# Patient Record
Sex: MALE | Race: WHITE | NOT HISPANIC OR LATINO | Employment: FULL TIME | URBAN - METROPOLITAN AREA
[De-identification: names, ages, dates, MRNs, and addresses within clinical notes are randomized per-mention and may not be internally consistent; named-entity substitution may affect disease eponyms.]

---

## 2018-01-16 NOTE — PROGRESS NOTES
Assessment    1  Major depression (296 20) (F32 9)   2  Nicotine dependence (305 1) (F17 200)    Plan  Nicotine dependence    · Nicotine 21 MG/24HR Transdermal Patch 24 Hour; APPLY 1 PATCH DAILY AS  DIRECTED   · We recommend you quit smoking  Time spent counseling today was greater than 3  minutes ; Status:Complete;   Done: [id number]    Discussion/Summary    1  Major depression-patient has been stable for a decade on Zoloft 100 mg daily  He denies any side effects of the medication and would like to continue  Refill was given  Followup in the next 6 months  2  Nicotinic dependence-smoking cessation was discussed and patient plans to use nicotinic patches  Disclaimer: This note has been dictated but not proofread  Voice recognition software has been used and may contain errors  The patient was counseled regarding instructions for management, risk factor reductions, prognosis, patient and family education, impressions, risks and benefits of treatment options, importance of compliance with treatment  total time of encounter was 25 minutes and 20 minutes was spent counseling  Chief Complaint  Pt would like to discuss our office taking over prescribing his Sertraline 100 [facility] QD [facility] but due to scheduling difficulty he would like to change  Also pt would like to quit smoking and would like to use the patches and needs something in writing that they are safe with his current medication  kw      History of Present Illness  HPI: This is a 78-year-old gentleman that presents to the office for followup of depression  He has been on treatment with Zoloft for a decade  He has been feeling well on the 100 mg daily without complaints  He has most recently been [facility] this however it has been more difficult to get care there recently and he is interested in having us follow his medication regimen   He is also interested in smoking cessation and is currently smoking a half pack of cigarettes daily  He has thought about treatment options and is most interested in using the patches  Review of Systems    Constitutional: no fever, not feeling poorly, no chills and not feeling tired  ENT: no nosebleeds and no nasal discharge  Cardiovascular: no chest pain and no palpitations  Respiratory: no cough and no shortness of breath during exertion  Gastrointestinal: no nausea and no diarrhea  Musculoskeletal: no arthralgias and no myalgias  Active Problems    1  Cerumen impaction (380 4) (H61 20)   2  Otitis externa (380 10) (H60 90)    Past Medical History  Active Problems And Past Medical History Reviewed: The active problems and past medical history were reviewed and updated today  Surgical History    1  History of Myringotomy - With Ventilating Tube Insertion    Current Meds   1  [employer] Saline Nasal [place] SOLN; USE 1 SPRAY IN EACH NOSTRIL TWICE DAILY   Recorded   2  Zoloft 100 MG Oral Tablet; Take 1 tablet daily as directed Recorded    The medication list was reviewed and updated today  Allergies    1  No Known Drug Allergies    Vitals   Recorded: [id number] 12:41PM   Heart Rate 68   Respiration 16   Systolic 183   Diastolic 80   Height 5 ft 10 7 in   Weight 177 lb 4 oz   BMI Calculated 24 93   BSA Calculated 2     Physical Exam    Constitutional   General appearance: No acute distress, well appearing and well nourished  Eyes   Conjunctiva and lids: No swelling, erythema, or discharge  Pupils and irises: Equal, round and reactive to light  Ears, Nose, Mouth, and Throat   External inspection of ears and nose: Normal     Otoscopic examination: Tympanic membrance translucent with normal light reflex  Canals patent without erythema  Nasal mucosa, septum, and turbinates: Normal without edema or erythema  Oropharynx: Normal with no erythema, edema, exudate or lesions      Pulmonary   Respiratory effort: No increased work of breathing or signs of respiratory distress  Auscultation of lungs: Clear to auscultation, equal breath sounds bilaterally, no wheezes, no rales, no rhonci  Cardiovascular   Auscultation of heart: Normal rate and rhythm, normal S1 and S2, without murmurs  Examination of extremities for edema and/or varicosities: Normal     Abdomen   Abdomen: Non-tender, no masses  Liver and spleen: No hepatomegaly or splenomegaly  Musculoskeletal   Gait and station: Normal     Skin   Skin and subcutaneous tissue: Normal without rashes or lesions  Neurologic   Reflexes: 2+ and symmetric  Sensation: No sensory loss      Psychiatric   Orientation to person, place and time: Normal     Mood and affect: Normal          Results/Data  PHQ-2 Adult Depression Screening [id number] 12:43PM User, [employer]     Test Name Result Flag Reference   PHQ-2 Adult Depression Score 0     Q1: 0, Q2: 0   PHQ-2 Adult Depression Screening Negative       eCalcs - Health Calculators [id number] 12:42PM User, [employer]     Test Name Result Flag Reference   SBIRT Screen - Tobacco Screening Result Positive         Signatures   Electronically signed by : Caity White, [facility]; Feb  3 2016 12:58PM EST                       (Author)    Electronically signed by : LEFTY Ordoñez ; Feb  3 2016  3:08PM EST

## 2018-12-07 ENCOUNTER — TELEPHONE (OUTPATIENT)
Dept: GASTROENTEROLOGY | Facility: CLINIC | Age: 43
End: 2018-12-07

## 2018-12-21 ENCOUNTER — OFFICE VISIT (OUTPATIENT)
Dept: GASTROENTEROLOGY | Facility: MEDICAL CENTER | Age: 43
End: 2018-12-21
Payer: OTHER GOVERNMENT

## 2018-12-21 VITALS
WEIGHT: 197 LBS | DIASTOLIC BLOOD PRESSURE: 60 MMHG | BODY MASS INDEX: 27.58 KG/M2 | HEART RATE: 60 BPM | SYSTOLIC BLOOD PRESSURE: 120 MMHG | TEMPERATURE: 96.9 F | HEIGHT: 71 IN

## 2018-12-21 DIAGNOSIS — D50.9 IRON DEFICIENCY ANEMIA, UNSPECIFIED IRON DEFICIENCY ANEMIA TYPE: ICD-10-CM

## 2018-12-21 DIAGNOSIS — Z86.19 HISTORY OF HELICOBACTER PYLORI INFECTION: ICD-10-CM

## 2018-12-21 DIAGNOSIS — K26.9 DUODENAL ULCER: Primary | ICD-10-CM

## 2018-12-21 PROCEDURE — 99203 OFFICE O/P NEW LOW 30 MIN: CPT | Performed by: INTERNAL MEDICINE

## 2018-12-21 RX ORDER — PANTOPRAZOLE SODIUM 40 MG/1
TABLET, DELAYED RELEASE ORAL
Refills: 0 | COMMUNITY
Start: 2018-11-10 | End: 2021-03-09

## 2018-12-21 RX ORDER — SERTRALINE HYDROCHLORIDE 100 MG/1
100 TABLET, FILM COATED ORAL
COMMUNITY
End: 2022-04-06 | Stop reason: SDUPTHER

## 2018-12-21 NOTE — PATIENT INSTRUCTIONS
Take pantoprazole for 2 week than   Wean off of pantoprazole over 2 weeks (every other day and every 2-3 days)   Avoid NSAIDs  Check stool H Pylori 2 weeks after off of Pantoprazole

## 2018-12-21 NOTE — LETTER
December 21, 2018     Reji Fong MD  [address]  [address]    Patient: Eve Ballard   YOB: 1975   Date of Visit: 12/21/2018       Dear Dr Grewal Cancer: Thank you for referring Daiva Goldmann to me for evaluation  Below are my notes for this consultation  If you have questions, please do not hesitate to call me  I look forward to following your patient along with you  Sincerely,        Tereso Brandon MD        CC: DO Tereso Ball MD  12/21/2018 12:40 PM  Sign at close encounter  [facility] - Outpatient Consultation  Eve Ballard 44 y o  male MRN: [id number]  Encounter: [id number]          ASSESSMENT AND PLAN:      1  Iron deficiency anemia, unspecified iron deficiency anemia type as a result of duodenal ulcer  - CBC and differential; Future    2  History of Helicobacter pylori infection  - H  pylori antigen, stool; Future    Continue monitor H&H  We will continue PPI for 2 weeks and slowly wean off and check stool H pylori once she is off PPI for 2 weeks  No need for repeat EGD for duodenal ulcer  Currently asymptomatic    ______________________________________________________________________    HPI:      He was admitted in 1st week of November 2018 for evaluation for melena  He was identified to have H pylori on biopsies associated gastritis and duodenal ulcerations which were less than 1 cm   2 duodenal ulcers were identified  He was started on PPI therapy  His hemoglobin was approximately 12 when prior to that was within normal limits  He has no signs overt bleeding currently taking iron therapy  He has eradicated for H pylori which triple therapy with 2 weeks of antibiotics  Currently continue take pantoprazole daily  He reports no signs overt bleeding  He is taking iron supplements for borderline anemia  He presents here for follow-up of duodenal ulcers, anemia and history of H pylori  Discuss slowly weaning off PPI therapy and check to make sure H pylori has been eradicated  No need for repeat endoscopy to confirm duodenal ulcer healing  He was based in the [place] [facility] when he was in the [facility] was exposed H pylori  REVIEW OF SYSTEMS:    CONSTITUTIONAL: Denies any fever, chills, rigors, and weight loss  HEENT: No earache or tinnitus  Denies hearing loss or visual disturbances  CARDIOVASCULAR: No chest pain or palpitations  RESPIRATORY: Denies any cough, hemoptysis, shortness of breath or dyspnea on exertion  GASTROINTESTINAL: As noted in the History of Present Illness  GENITOURINARY: No problems with urination  Denies any hematuria or dysuria  NEUROLOGIC: No dizziness or vertigo, denies headaches  MUSCULOSKELETAL: Denies any muscle or joint pain  SKIN: Denies skin rashes or itching  ENDOCRINE: Denies excessive thirst  Denies intolerance to heat or cold  PSYCHOSOCIAL: Denies depression or anxiety  Denies any recent memory loss  Historical Information   No past medical history on file  Past Surgical History:   Procedure Laterality Date    TYMPANOSTOMY TUBE PLACEMENT       Social History   History   Alcohol Use    Yes     History   Drug Use No     History   Smoking Status    Current Some Day Smoker   Smokeless Tobacco    Never Used     Family History   Problem Relation Age of Onset    Cancer Maternal Grandfather        Meds/Allergies       Current Outpatient Prescriptions:     pantoprazole (PROTONIX) 40 mg tablet    sertraline (ZOLOFT) 100 mg tablet    No Known Allergies        Objective     Blood pressure 120/60, pulse 60, temperature (!) 96 9 °F (36 1 °C), temperature source Tympanic, height 5' 11" (1 803 m), weight 89 4 kg (197 lb)  Body mass index is 27 48 kg/m²          PHYSICAL EXAM:      General Appearance:   Alert, cooperative, no distress   HEENT:   Normocephalic, atraumatic, anicteric      Neck:  Supple, symmetrical, trachea midline   Lungs:   Clear to auscultation bilaterally; no rales, rhonchi or wheezing; respirations unlabored    Heart[de-identified]   Regular rate and rhythm; no murmur, rub, or gallop  Abdomen:   Soft, non-tender, non-distended; normal bowel sounds; no masses, no organomegaly    Genitalia:   Deferred    Rectal:   Deferred    Extremities:  No cyanosis, clubbing or edema    Pulses:  2+ and symmetric    Skin:  No jaundice, rashes, or lesions    Lymph nodes:  No palpable cervical lymphadenopathy        Lab Results:   No visits with results within 1 Day(s) from this visit  Latest known visit with results is:   No results found for any previous visit  Radiology Results:   No results found

## 2018-12-21 NOTE — PROGRESS NOTES
[facility]  Regulo Kaminski 44 y o  male MRN: [id number]  Encounter: [id number]          ASSESSMENT AND PLAN:      1  Iron deficiency anemia, unspecified iron deficiency anemia type as a result of duodenal ulcer  - CBC and differential; Future    2  History of Helicobacter pylori infection  - H  pylori antigen, stool; Future    Continue monitor H&H  We will continue PPI for 2 weeks and slowly wean off and check stool H pylori once she is off PPI for 2 weeks  No need for repeat EGD for duodenal ulcer  Currently asymptomatic    ______________________________________________________________________    HPI:      He was admitted in 1st week of November 2018 for evaluation for melena  He was identified to have H pylori on biopsies associated gastritis and duodenal ulcerations which were less than 1 cm   2 duodenal ulcers were identified  He was started on PPI therapy  His hemoglobin was approximately 12 when prior to that was within normal limits  He has no signs overt bleeding currently taking iron therapy  He has eradicated for H pylori which triple therapy with 2 weeks of antibiotics  Currently continue take pantoprazole daily  He reports no signs overt bleeding  He is taking iron supplements for borderline anemia  He presents here for follow-up of duodenal ulcers, anemia and history of H pylori  Discuss slowly weaning off PPI therapy and check to make sure H pylori has been eradicated  No need for repeat endoscopy to confirm duodenal ulcer healing  He was based in the [place] [facility] when he was in the [facility] was exposed H pylori  REVIEW OF SYSTEMS:    CONSTITUTIONAL: Denies any fever, chills, rigors, and weight loss  HEENT: No earache or tinnitus  Denies hearing loss or visual disturbances  CARDIOVASCULAR: No chest pain or palpitations     RESPIRATORY: Denies any cough, hemoptysis, shortness of breath or dyspnea on exertion  GASTROINTESTINAL: As noted in the History of Present Illness  GENITOURINARY: No problems with urination  Denies any hematuria or dysuria  NEUROLOGIC: No dizziness or vertigo, denies headaches  MUSCULOSKELETAL: Denies any muscle or joint pain  SKIN: Denies skin rashes or itching  ENDOCRINE: Denies excessive thirst  Denies intolerance to heat or cold  PSYCHOSOCIAL: Denies depression or anxiety  Denies any recent memory loss  Historical Information   No past medical history on file  Past Surgical History:   Procedure Laterality Date    TYMPANOSTOMY TUBE PLACEMENT       Social History   History   Alcohol Use    Yes     History   Drug Use No     History   Smoking Status    Current Some Day Smoker   Smokeless Tobacco    Never Used     Family History   Problem Relation Age of Onset    Cancer Maternal Grandfather        Meds/Allergies       Current Outpatient Prescriptions:     pantoprazole (PROTONIX) 40 mg tablet    sertraline (ZOLOFT) 100 mg tablet    No Known Allergies        Objective     Blood pressure 120/60, pulse 60, temperature (!) 96 9 °F (36 1 °C), temperature source Tympanic, height 5' 11" (1 803 m), weight 89 4 kg (197 lb)  Body mass index is 27 48 kg/m²  PHYSICAL EXAM:      General Appearance:   Alert, cooperative, no distress   HEENT:   Normocephalic, atraumatic, anicteric      Neck:  Supple, symmetrical, trachea midline   Lungs:   Clear to auscultation bilaterally; no rales, rhonchi or wheezing; respirations unlabored    Heart[de-identified]   Regular rate and rhythm; no murmur, rub, or gallop  Abdomen:   Soft, non-tender, non-distended; normal bowel sounds; no masses, no organomegaly    Genitalia:   Deferred    Rectal:   Deferred    Extremities:  No cyanosis, clubbing or edema    Pulses:  2+ and symmetric    Skin:  No jaundice, rashes, or lesions    Lymph nodes:  No palpable cervical lymphadenopathy        Lab Results:   No visits with results within 1 Day(s) from this visit  Latest known visit with results is:   No results found for any previous visit  Radiology Results:   No results found

## 2019-02-08 ENCOUNTER — TELEPHONE (OUTPATIENT)
Dept: GASTROENTEROLOGY | Facility: MEDICAL CENTER | Age: 44
End: 2019-02-08

## 2019-02-08 ENCOUNTER — APPOINTMENT (OUTPATIENT)
Dept: LAB | Facility: MEDICAL CENTER | Age: 44
End: 2019-02-08
Attending: INTERNAL MEDICINE
Payer: OTHER GOVERNMENT

## 2019-02-08 DIAGNOSIS — D50.9 IRON DEFICIENCY ANEMIA, UNSPECIFIED IRON DEFICIENCY ANEMIA TYPE: ICD-10-CM

## 2019-02-08 DIAGNOSIS — Z86.19 HISTORY OF HELICOBACTER PYLORI INFECTION: ICD-10-CM

## 2019-02-08 LAB
BASOPHILS # BLD AUTO: 0.03 THOUSANDS/ΜL (ref 0–0.1)
BASOPHILS NFR BLD AUTO: 1 % (ref 0–1)
EOSINOPHIL # BLD AUTO: 0.11 THOUSAND/ΜL (ref 0–0.61)
EOSINOPHIL NFR BLD AUTO: 2 % (ref 0–6)
ERYTHROCYTE [DISTWIDTH] IN BLOOD BY AUTOMATED COUNT: 12.3 % (ref 11.6–15.1)
HCT VFR BLD AUTO: 43.6 % (ref 36.5–49.3)
HGB BLD-MCNC: 14.7 G/DL (ref 12–17)
IMM GRANULOCYTES # BLD AUTO: 0.01 THOUSAND/UL (ref 0–0.2)
IMM GRANULOCYTES NFR BLD AUTO: 0 % (ref 0–2)
LYMPHOCYTES # BLD AUTO: 1.96 THOUSANDS/ΜL (ref 0.6–4.47)
LYMPHOCYTES NFR BLD AUTO: 43 % (ref 14–44)
MCH RBC QN AUTO: 30.1 PG (ref 26.8–34.3)
MCHC RBC AUTO-ENTMCNC: 33.7 G/DL (ref 31.4–37.4)
MCV RBC AUTO: 89 FL (ref 82–98)
MONOCYTES # BLD AUTO: 0.48 THOUSAND/ΜL (ref 0.17–1.22)
MONOCYTES NFR BLD AUTO: 10 % (ref 4–12)
NEUTROPHILS # BLD AUTO: 2.02 THOUSANDS/ΜL (ref 1.85–7.62)
NEUTS SEG NFR BLD AUTO: 44 % (ref 43–75)
NRBC BLD AUTO-RTO: 0 /100 WBCS
PLATELET # BLD AUTO: 209 THOUSANDS/UL (ref 149–390)
PMV BLD AUTO: 11.1 FL (ref 8.9–12.7)
RBC # BLD AUTO: 4.89 MILLION/UL (ref 3.88–5.62)
WBC # BLD AUTO: 4.61 THOUSAND/UL (ref 4.31–10.16)

## 2019-02-08 PROCEDURE — 36415 COLL VENOUS BLD VENIPUNCTURE: CPT

## 2019-02-08 PROCEDURE — 85025 COMPLETE CBC W/AUTO DIFF WBC: CPT

## 2019-02-08 PROCEDURE — 87338 HPYLORI STOOL AG IA: CPT

## 2019-02-08 NOTE — TELEPHONE ENCOUNTER
----- Message from Nuno Garcia MD sent at 2/8/2019  1:57 PM EST -----  Call patient to report normal results

## 2019-02-10 LAB — H PYLORI AG STL QL IA: NEGATIVE

## 2019-02-13 ENCOUNTER — TELEPHONE (OUTPATIENT)
Dept: GASTROENTEROLOGY | Facility: MEDICAL CENTER | Age: 44
End: 2019-02-13

## 2019-02-13 NOTE — TELEPHONE ENCOUNTER
----- Message from Stephanie Ayoub MD sent at 2/13/2019  2:13 PM EST -----  Call patient to report normal results

## 2019-09-25 ENCOUNTER — OFFICE VISIT (OUTPATIENT)
Dept: FAMILY MEDICINE CLINIC | Facility: CLINIC | Age: 44
End: 2019-09-25
Payer: COMMERCIAL

## 2019-09-25 VITALS
RESPIRATION RATE: 18 BRPM | HEIGHT: 71 IN | BODY MASS INDEX: 24.92 KG/M2 | WEIGHT: 178 LBS | DIASTOLIC BLOOD PRESSURE: 82 MMHG | TEMPERATURE: 99 F | HEART RATE: 72 BPM | SYSTOLIC BLOOD PRESSURE: 118 MMHG

## 2019-09-25 DIAGNOSIS — R11.0 NAUSEA: ICD-10-CM

## 2019-09-25 DIAGNOSIS — D50.9 IRON DEFICIENCY ANEMIA, UNSPECIFIED IRON DEFICIENCY ANEMIA TYPE: ICD-10-CM

## 2019-09-25 DIAGNOSIS — F43.21 GRIEF REACTION: ICD-10-CM

## 2019-09-25 DIAGNOSIS — F33.40 RECURRENT MAJOR DEPRESSIVE DISORDER, IN REMISSION (HCC): ICD-10-CM

## 2019-09-25 DIAGNOSIS — R10.9 ABDOMINAL CRAMPS: ICD-10-CM

## 2019-09-25 DIAGNOSIS — Z86.19 HISTORY OF HELICOBACTER PYLORI INFECTION: ICD-10-CM

## 2019-09-25 DIAGNOSIS — F17.210 CIGARETTE NICOTINE DEPENDENCE WITHOUT COMPLICATION: ICD-10-CM

## 2019-09-25 DIAGNOSIS — K21.00 GASTRO-ESOPHAGEAL REFLUX DISEASE WITH ESOPHAGITIS: ICD-10-CM

## 2019-09-25 DIAGNOSIS — K26.9 DUODENAL ULCER: Primary | ICD-10-CM

## 2019-09-25 DIAGNOSIS — R19.7 DIARRHEA, UNSPECIFIED TYPE: ICD-10-CM

## 2019-09-25 DIAGNOSIS — F41.9 ANXIETY: ICD-10-CM

## 2019-09-25 PROCEDURE — 3008F BODY MASS INDEX DOCD: CPT | Performed by: FAMILY MEDICINE

## 2019-09-25 PROCEDURE — 99204 OFFICE O/P NEW MOD 45 MIN: CPT | Performed by: FAMILY MEDICINE

## 2019-09-25 RX ORDER — DICYCLOMINE HYDROCHLORIDE 10 MG/1
10 CAPSULE ORAL
Qty: 40 CAPSULE | Refills: 0 | Status: SHIPPED | OUTPATIENT
Start: 2019-09-25 | End: 2021-03-09

## 2019-09-25 NOTE — PROGRESS NOTES
Assessment and Plan:  1  Abdominal cramps/nausea/ diarrhea  I believe secondary to below  Bentyl was ordered  2  Grief reaction, refer for counseling  3  Anxiety / depression patient is on Zoloft 100 mg per Psychiatry continue with this  4  Duodenal ulcer/ H pylori/ GERD/ iron deficiency, stable patient follows with Gastroenterology  5  Tobacco abuse, complete cessation is recommended  6  Patient to return in 1 week if still symptoms  If symptoms worsen call office report to [facility]    Problem List Items Addressed This Visit        Digestive    Duodenal ulcer - Primary    Relevant Medications    dicyclomine (BENTYL) 10 mg capsule    Gastro-esophageal reflux disease with esophagitis    Relevant Medications    dicyclomine (BENTYL) 10 mg capsule       Other    Iron deficiency anemia    History of Helicobacter pylori infection    Anxiety      Continue Zoloft per Psychiatry         Major depression      Continue Zoloft per Psychiatry         Nicotine dependence      Complete tobacco cessation recommended           Other Visit Diagnoses     Grief reaction        Relevant Orders    Ambulatory referral to Social Work    Abdominal cramps        Relevant Medications    dicyclomine (BENTYL) 10 mg capsule    Nausea        Relevant Medications    dicyclomine (BENTYL) 10 mg capsule    Diarrhea, unspecified type        Relevant Medications    dicyclomine (BENTYL) 10 mg capsule                 Diagnoses and all orders for this visit:    Duodenal ulcer    Gastro-esophageal reflux disease with esophagitis    Iron deficiency anemia, unspecified iron deficiency anemia type    History of Helicobacter pylori infection    Anxiety    Recurrent major depressive disorder, in remission ([facility])    Cigarette nicotine dependence without complication    Grief reaction  -     Ambulatory referral to Social Work; Future    Abdominal cramps  -     dicyclomine (BENTYL) 10 mg capsule;  Take 1 capsule (10 mg total) by mouth 4 (four) times a day (before meals and at bedtime)    Nausea  -     dicyclomine (BENTYL) 10 mg capsule; Take 1 capsule (10 mg total) by mouth 4 (four) times a day (before meals and at bedtime)    Diarrhea, unspecified type  -     dicyclomine (BENTYL) 10 mg capsule; Take 1 capsule (10 mg total) by mouth 4 (four) times a day (before meals and at bedtime)              Subjective:      Patient ID: Vj Gomes is a 38 y o  male  CC:    Chief Complaint   Patient presents with    Diarrhea     Patient present today for diarrhea, stomach cramps and vomiting for the last 3 days  Per patient he had to put his dog down and ever since he is having this symptoms  He believes he might be having anxiety attacks  HPI:     Patient had to put his 6year-old dog to sleep 3 days ago  This is certainly upset him since this time he had poor appetite nausea and loose stools  Patient does have a history of duodenal ulcer in GERD is on  Pantoprazole  Patient feels this is different pills patient feels this is all stress and anxiety secondary to the dog  Offered give him something for the anxiety and grief patient declined  Patient use exercise will see counselor in office I will set that up  We use Bentyl for his GI complaints  If patient still symptoms next week return to office  Patient last seen in this  office 07/11/2016      The following portions of the patient's history were reviewed and updated as appropriate: allergies, current medications, past family history, past medical history, past social history, past surgical history and problem list       Review of Systems   Constitutional: Negative  HENT: Negative  Eyes: Negative  Respiratory: Negative  Cardiovascular: Negative  Gastrointestinal:         HPI   Endocrine: Negative  Genitourinary: Negative  Musculoskeletal: Negative  Skin: Negative  Allergic/Immunologic: Negative  Neurological: Negative      Hematological: Negative  Psychiatric/Behavioral:         HP         Data to review:       Objective:    Vitals:    09/25/19 1525   BP: 118/82   BP Location: Left arm   Patient Position: Sitting   Cuff Size: Standard   Pulse: 72   Resp: 18   Temp: 99 °F (37 2 °C)   TempSrc: Temporal   Weight: 80 7 kg (178 lb)   Height: 5' 11" (1 803 m)        Physical Exam   Constitutional: He is oriented to person, place, and time  He appears well-developed and well-nourished  HENT:   Head: Normocephalic and atraumatic  Mouth/Throat: Oropharynx is clear and moist    Eyes: Pupils are equal, round, and reactive to light  Conjunctivae and EOM are normal  No scleral icterus  Neck: Neck supple  No JVD present  Cardiovascular: Normal rate, regular rhythm and normal heart sounds  Pulmonary/Chest: Effort normal and breath sounds normal    Abdominal: Soft  Bowel sounds are normal  He exhibits no distension and no mass  There is no tenderness  There is no rebound and no guarding  No hernia  Musculoskeletal: He exhibits no edema  Neurological: He is alert and oriented to person, place, and time  No cranial nerve deficit  Skin: Skin is warm     Psychiatric:    Seems anxious

## 2019-09-25 NOTE — LETTER
September 25, 2019     Patient: Flavia Villa   YOB: 1975   Date of Visit: 9/25/2019       To Whom it May Concern:    Polly Mario is under my professional care  He was seen in my office on 9/25/2019  He may return to work on   09/30/2019  If you have any questions or concerns, please don't hesitate to call           Sincerely,          Kory Candelaria DO        CC: No Recipients

## 2019-09-25 NOTE — PATIENT INSTRUCTIONS
Patient follow up with counseling for grief reaction  Continue follow-up with Psychiatry and Gastroenterology per their instructions  Patient to return to office in 1 week if still symptoms    If symptoms worsen call office report to [facility] Department

## 2020-06-30 ENCOUNTER — TELEPHONE (OUTPATIENT)
Dept: FAMILY MEDICINE CLINIC | Facility: CLINIC | Age: 45
End: 2020-06-30

## 2020-06-30 ENCOUNTER — TELEMEDICINE (OUTPATIENT)
Dept: FAMILY MEDICINE CLINIC | Facility: CLINIC | Age: 45
End: 2020-06-30
Payer: COMMERCIAL

## 2020-06-30 VITALS — DIASTOLIC BLOOD PRESSURE: 97 MMHG | SYSTOLIC BLOOD PRESSURE: 138 MMHG | HEART RATE: 75 BPM

## 2020-06-30 DIAGNOSIS — M54.2 NECK PAIN: ICD-10-CM

## 2020-06-30 DIAGNOSIS — M25.511 ACUTE PAIN OF RIGHT SHOULDER: Primary | ICD-10-CM

## 2020-06-30 PROCEDURE — 99214 OFFICE O/P EST MOD 30 MIN: CPT | Performed by: NURSE PRACTITIONER

## 2020-06-30 RX ORDER — CYCLOBENZAPRINE HCL 10 MG
10 TABLET ORAL
Qty: 30 TABLET | Refills: 0 | Status: SHIPPED | OUTPATIENT
Start: 2020-06-30 | End: 2021-03-09

## 2020-07-13 ENCOUNTER — OFFICE VISIT (OUTPATIENT)
Dept: FAMILY MEDICINE CLINIC | Facility: CLINIC | Age: 45
End: 2020-07-13
Payer: COMMERCIAL

## 2020-07-13 VITALS
HEIGHT: 71 IN | BODY MASS INDEX: 27.27 KG/M2 | DIASTOLIC BLOOD PRESSURE: 80 MMHG | WEIGHT: 194.8 LBS | TEMPERATURE: 98 F | HEART RATE: 76 BPM | SYSTOLIC BLOOD PRESSURE: 122 MMHG

## 2020-07-13 DIAGNOSIS — E66.3 OVERWEIGHT: ICD-10-CM

## 2020-07-13 DIAGNOSIS — F17.210 CIGARETTE NICOTINE DEPENDENCE WITHOUT COMPLICATION: ICD-10-CM

## 2020-07-13 DIAGNOSIS — D50.9 IRON DEFICIENCY ANEMIA, UNSPECIFIED IRON DEFICIENCY ANEMIA TYPE: ICD-10-CM

## 2020-07-13 DIAGNOSIS — E87.1 HYPONATREMIA: ICD-10-CM

## 2020-07-13 DIAGNOSIS — Z01.818 PRE-OP EXAM: Primary | ICD-10-CM

## 2020-07-13 DIAGNOSIS — S92.901A CLOSED FRACTURE OF RIGHT FOOT, INITIAL ENCOUNTER: ICD-10-CM

## 2020-07-13 DIAGNOSIS — Z01.818 PREOPERATIVE CLEARANCE: ICD-10-CM

## 2020-07-13 PROCEDURE — 93000 ELECTROCARDIOGRAM COMPLETE: CPT | Performed by: FAMILY MEDICINE

## 2020-07-13 PROCEDURE — 99214 OFFICE O/P EST MOD 30 MIN: CPT | Performed by: FAMILY MEDICINE

## 2020-07-13 PROCEDURE — 3008F BODY MASS INDEX DOCD: CPT | Performed by: FAMILY MEDICINE

## 2020-07-13 NOTE — PROGRESS NOTES
Assessment and Plan:  1  Preop exam/clearance, blood work was reviewed  EKG completed a normal, exam was completed  Patient is cleared for upcoming surgical procedure  2  Right foot fracture, 2nd 3rd and 4th metatarsal fractures  Patient requires ORIF by Podiatry, Dr Wanda Gibbs at [facility] 07/14/2020  3  Hyponatremia, mild  Patient to increase sodium specially with electrolyte replacement fluids  Check sodium level 2 weeks  3  Iron deficiency anemia, follows with Gastroenterology  CBC normal the present time  4  Tobacco abuse, complete cessation recommended  5  BMI 27 17 patient gained 16 lb  Diet exercise weight loss recommended          Problem List Items Addressed This Visit        Other    Iron deficiency anemia     Follows with Gastroenterology, CBC normal the present time         Relevant Orders    Sodium    Nicotine dependence     Complete tobacco cessation recommended         Relevant Orders    Sodium    Overweight     BMI 27 17 patient gained 16 lb since last office visit  Diet exercise weight loss recommend         Relevant Orders    Sodium      Other Visit Diagnoses     Pre-op exam    -  Primary    Relevant Orders    POCT ECG (Completed)    Sodium    Preoperative clearance        Relevant Orders    Sodium    Closed fracture of right foot, initial encounter        Relevant Orders    Sodium    Hyponatremia        Relevant Orders    Sodium                 Diagnoses and all orders for this visit:    Pre-op exam  -     POCT ECG  -     Sodium; Future    Preoperative clearance  -     Sodium; Future    Closed fracture of right foot, initial encounter  -     Sodium; Future    Hyponatremia  -     Sodium; Future    Iron deficiency anemia, unspecified iron deficiency anemia type  -     Sodium; Future    Cigarette nicotine dependence without complication  -     Sodium; Future    Overweight  -     Sodium; Future              Subjective:      Patient ID: Nallely Roblero is a 41 y  o  male     CC:    Chief Complaint   Patient presents with    Pre-op Clearance     Pre op for Foot surgery with Dr Huan Del Castillo on 7/14/2020 at [facility]  Pt broke his right foot in 3 different places  kw       HPI:    Patient has fracture of 2nd 3rd 4th metatarsal bones  For OR/IF on 07/14/2020 at [facility] with Dr Huan Del Castillo, podiatry  CMP, CBC were reviewed  EKG was completed and normal in office today  The following portions of the patient's history were reviewed and updated as appropriate: allergies, current medications, past family history, past medical history, past social history, past surgical history and problem list       Review of Systems   Constitutional:        Patient did gain 16 lb since last office visit   HENT: Negative  Eyes: Negative  Respiratory: Negative  Cardiovascular: Negative  Gastrointestinal: Negative  Endocrine: Negative  Genitourinary: Negative  Musculoskeletal:        HPI   Skin: Negative  Allergic/Immunologic: Negative  Neurological: Negative  Hematological: Negative  Psychiatric/Behavioral: Negative  Data to review:       Objective:    Vitals:    07/13/20 1307   BP: 122/80   BP Location: Left arm   Patient Position: Sitting   Pulse: 76   Temp: 98 °F (36 7 °C)   TempSrc: Temporal   Weight: 88 4 kg (194 lb 12 8 oz)   Height: 5' 11" (1 803 m)        Physical Exam   Constitutional: He is oriented to person, place, and time  He appears well-developed and well-nourished  HENT:   Head: Normocephalic and atraumatic  Eyes: Pupils are equal, round, and reactive to light  Conjunctivae and EOM are normal  Right eye exhibits no discharge  Left eye exhibits no discharge  No scleral icterus  Neck: Neck supple  No JVD present  Cardiovascular: Normal rate, regular rhythm, normal heart sounds and intact distal pulses  Pulmonary/Chest: Effort normal and breath sounds normal    Abdominal: Soft   Bowel sounds are normal  There is no tenderness  Musculoskeletal: He exhibits no edema  Right foot in cast   Neurological: He is alert and oriented to person, place, and time  No cranial nerve deficit  Skin: Skin is warm and dry  Psychiatric: He has a normal mood and affect  BMI Counseling: Body mass index is 27 17 kg/m²  The BMI is above normal  Nutrition recommendations include moderation in carbohydrate intake and reducing intake of cholesterol

## 2020-07-13 NOTE — PATIENT INSTRUCTIONS
Increase sodium i e  Electrolyte replacement fluids  Diet exercise weight loss recommended  Complete tobacco cessation recommended  Recheck sodium level 2 weeks

## 2020-09-24 ENCOUNTER — OFFICE VISIT (OUTPATIENT)
Dept: FAMILY MEDICINE CLINIC | Facility: CLINIC | Age: 45
End: 2020-09-24
Payer: COMMERCIAL

## 2020-09-24 VITALS
WEIGHT: 195 LBS | SYSTOLIC BLOOD PRESSURE: 122 MMHG | HEART RATE: 64 BPM | BODY MASS INDEX: 27.3 KG/M2 | TEMPERATURE: 97.8 F | DIASTOLIC BLOOD PRESSURE: 72 MMHG | HEIGHT: 71 IN

## 2020-09-24 DIAGNOSIS — D49.2 SKIN NEOPLASM: Primary | ICD-10-CM

## 2020-09-24 PROCEDURE — 88305 TISSUE EXAM BY PATHOLOGIST: CPT | Performed by: PATHOLOGY

## 2020-09-24 PROCEDURE — 11301 SHAVE SKIN LESION 0.6-1.0 CM: CPT | Performed by: FAMILY MEDICINE

## 2020-09-24 NOTE — PROGRESS NOTES
Assessment and Plan:  1  Skin neoplasm posterior left shoulder  Status post shave biopsy biopsy sent to lab  If patient does not hear about the report in 10-14 days he is to call the office  Patient clean area apply Neosporin sterile dressing daily until healed    Problem List Items Addressed This Visit     None                 There are no diagnoses linked to this encounter  Subjective:      Patient ID: Nallely Roblero is a 41 y o  male  CC:    Chief Complaint   Patient presents with    Mole     Pt has a mole on his left shoulder  Slight difference in texture  He would like this removed  kw       HPI:    Skin neoplasm posterior left shoulder enlarging  Patient wishes excision      The following portions of the patient's history were reviewed and updated as appropriate: allergies, current medications, past family history, past medical history, past social history, past surgical history and problem list       Review of Systems   Skin:        HPI         Data to review:       Objective:    Vitals:    09/24/20 1145   BP: 122/72   BP Location: Left arm   Patient Position: Sitting   Pulse: 64   Temp: 97 8 °F (36 6 °C)   TempSrc: Temporal   Weight: 88 5 kg (195 lb)   Height: 5' 11" (1 803 m)        Physical Exam  Skin:     Comments: Posterior left shoulder with raise tannish brown mildly irregular neoplasm 0 9 x 0 9 cm         Shave lesion    Date/Time: 9/24/2020 12:15 PM  Performed by: Isaura Torres DO  Authorized by: Isaura Torres DO       Comments:  Preoperative diagnosis:  Neoplasm left posterior shoulder rule out carcinoma  Postop diagnosis:  Same  Procedure:  Area cleansed with alcohol  Local anesthesia with 1 2 cc lidocaine 2% with epi  0 9 x 0 9 cm shave biopsy completed with 15  Scalpel  Biopsy placed in container was sent to lab  Hemostasis with electrocautery  Neosporin and Band-Aid place    Wound care instructions given    Risk and benefit this procedure was discussed prior to the procedure including risk of bleeding, infection, and scarring    Patient verbalized understanding wished to proceed with procedure

## 2020-09-24 NOTE — PATIENT INSTRUCTIONS
Call office in 10-14 days if you do not receive a biopsy report  Cleanse area daily apply Neosporin and a sterile dressing until fully healed

## 2021-02-16 DIAGNOSIS — M25.511 ACUTE PAIN OF RIGHT SHOULDER: Primary | ICD-10-CM

## 2021-02-16 NOTE — TELEPHONE ENCOUNTER
NEEDS A REFILL OF    diclofenac sodium (VOLTAREN) 1 %     CALLED INTO [facility] IN [place], ANY QUESTIONS PT CAN BE REACHED -[phone]

## 2021-03-09 ENCOUNTER — OFFICE VISIT (OUTPATIENT)
Dept: FAMILY MEDICINE CLINIC | Facility: CLINIC | Age: 46
End: 2021-03-09
Payer: COMMERCIAL

## 2021-03-09 VITALS
HEIGHT: 71 IN | BODY MASS INDEX: 28.98 KG/M2 | SYSTOLIC BLOOD PRESSURE: 120 MMHG | HEART RATE: 80 BPM | DIASTOLIC BLOOD PRESSURE: 90 MMHG | TEMPERATURE: 98.3 F | WEIGHT: 207 LBS

## 2021-03-09 DIAGNOSIS — Z13.1 SCREENING FOR DIABETES MELLITUS: ICD-10-CM

## 2021-03-09 DIAGNOSIS — Z13.29 SCREENING FOR THYROID DISORDER: ICD-10-CM

## 2021-03-09 DIAGNOSIS — Z00.00 ANNUAL PHYSICAL EXAM: Primary | ICD-10-CM

## 2021-03-09 DIAGNOSIS — D50.9 IRON DEFICIENCY ANEMIA, UNSPECIFIED IRON DEFICIENCY ANEMIA TYPE: ICD-10-CM

## 2021-03-09 DIAGNOSIS — Z13.220 SCREENING FOR LIPID DISORDERS: ICD-10-CM

## 2021-03-09 PROCEDURE — 99396 PREV VISIT EST AGE 40-64: CPT | Performed by: NURSE PRACTITIONER

## 2021-03-09 PROCEDURE — 3008F BODY MASS INDEX DOCD: CPT | Performed by: NURSE PRACTITIONER

## 2021-03-09 PROCEDURE — 1036F TOBACCO NON-USER: CPT | Performed by: NURSE PRACTITIONER

## 2021-03-09 NOTE — PROGRESS NOTES
ADULT ANNUAL PHYSICAL  [address] [facility]    NAME: Dulce Fields  AGE: 41 y o  SEX: male  : 1975     DATE: 3/9/2021     Assessment and Plan:     Problem List Items Addressed This Visit     None          Immunizations and preventive care screenings were discussed with patient today  Appropriate education was printed on patient's after visit summary  Counseling:  Alcohol/drug use: discussed moderation in alcohol intake, the recommendations for healthy alcohol use, and avoidance of illicit drug use  Dental Health: discussed importance of regular tooth brushing, flossing, and dental visits  Injury prevention: discussed safety/seat belts, safety helmets, smoke detectors, carbon dioxide detectors, and smoking near bedding or upholstery  Sexual health: discussed sexually transmitted diseases, partner selection, use of condoms, avoidance of unintended pregnancy, and contraceptive alternatives  · Exercise: the importance of regular exercise/physical activity was discussed  Recommend exercise 3-5 times per week for at least 30 minutes  BMI Counseling: Body mass index is 28 87 kg/m²  The BMI is above normal  Nutrition recommendations include reducing portion sizes, decreasing overall calorie intake and 3-5 servings of fruits/vegetables daily  Exercise recommendations include exercising 3-5 times per week and joining a gym  No follow-ups on file  Chief Complaint:     Chief Complaint   Patient presents with    Physical Exam     General physical to include BW  He doesn't want to go through the [place] clinic anymore  mjs      History of Present Illness:     Adult Annual Physical   Patient here for a comprehensive physical exam  The patient reports no problems      Diet and Physical Activity  · Diet/Nutrition: well balanced diet, heart healthy (low sodium) diet, limited junk food, low calorie diet, low fat diet, consuming 3-5 servings of fruits/vegetables daily and adequate whole grain intake  · Exercise: moderate cardiovascular exercise, 3-4 times a week on average and 1-2 hours on average  Depression Screening  PHQ-9 Depression Screening    PHQ-9:   Frequency of the following problems over the past two weeks:           General Health  · Sleep: sleeps well and gets 4-6 hours of sleep on average  · Hearing: normal - bilateral   · Vision: most recent eye exam >1 year ago and wears glasses  · Dental: no dental visits for >1 year, brushes teeth twice daily and flosses teeth occasionally   Health  · Symptoms include: none     Review of Systems:     Review of Systems   Constitutional: Negative for chills and fever  HENT: Negative for ear pain and sore throat  Eyes: Negative for pain and visual disturbance  Respiratory: Negative for cough, chest tightness, shortness of breath and wheezing  Cardiovascular: Negative for chest pain, palpitations and leg swelling  Gastrointestinal: Positive for constipation (intermittent)  Negative for abdominal pain, diarrhea, nausea and vomiting  Endocrine: Negative for cold intolerance, heat intolerance, polydipsia, polyphagia and polyuria  Genitourinary: Negative for decreased urine volume, difficulty urinating, dysuria, frequency, hematuria and urgency  Musculoskeletal: Negative for arthralgias, back pain and myalgias  Skin: Negative for color change and rash  Allergic/Immunologic: Negative for environmental allergies  Neurological: Negative for dizziness, seizures, syncope, weakness, light-headedness, numbness and headaches  Hematological: Negative for adenopathy  Psychiatric/Behavioral: Negative for confusion  The patient is not nervous/anxious  All other systems reviewed and are negative  Past Medical History:     No past medical history on file     Past Surgical History:     Past Surgical History:   Procedure Laterality Date    TYMPANOSTOMY TUBE PLACEMENT Family History:     Family History   Problem Relation Age of Onset    Cancer Maternal Grandfather       Social History:        Social History     Socioeconomic History    Marital status: Single     Spouse name: None    Number of children: None    Years of education: None    Highest education level: None   Occupational History    None   Social Needs    Financial resource strain: None    Food insecurity     Worry: None     Inability: None    Transportation needs     Medical: None     Non-medical: None   Tobacco Use    Smoking status: Former Smoker     Start date: 07/2020    Smokeless tobacco: Never Used   Substance and Sexual Activity    Alcohol use: Yes    Drug use: No    Sexual activity: None   Lifestyle    Physical activity     Days per week: None     Minutes per session: None    Stress: None   Relationships    Social connections     Talks on phone: None     Gets together: None     Attends [employer] service: None     Active member of club or organization: None     Attends meetings of clubs or organizations: None     Relationship status: None    Intimate partner violence     Fear of current or ex partner: None     Emotionally abused: None     Physically abused: None     Forced sexual activity: None   Other Topics Concern    None   Social History Narrative    None      Current Medications:     Current Outpatient Medications   Medication Sig Dispense Refill    Diclofenac Sodium (VOLTAREN) 1 % Apply 2 g topically 4 (four) times a day 1 Tube 5    sertraline (ZOLOFT) 100 mg tablet Take 100 mg by mouth       No current facility-administered medications for this visit  Allergies: Allergies   Allergen Reactions    Paxil [Paroxetine] Anxiety     And shakiness      Physical Exam:     /90   Pulse 80   Temp 98 3 °F (36 8 °C)   Ht 5' 11" (1 803 m)   Wt 93 9 kg (207 lb)   BMI 28 87 kg/m²     Physical Exam  Vitals signs and nursing note reviewed     Constitutional:       General: He is not in acute distress  Appearance: Normal appearance  He is well-developed  He is not ill-appearing  HENT:      Head: Normocephalic and atraumatic  Right Ear: Tympanic membrane normal  There is no impacted cerumen  Left Ear: Tympanic membrane normal  There is no impacted cerumen  Eyes:      Conjunctiva/sclera: Conjunctivae normal    Neck:      Musculoskeletal: Normal range of motion and neck supple  Cardiovascular:      Rate and Rhythm: Normal rate and regular rhythm  Pulses: Normal pulses  Carotid pulses are 2+ on the right side and 2+ on the left side  Posterior tibial pulses are 2+ on the right side and 2+ on the left side  Heart sounds: Normal heart sounds  No murmur  Pulmonary:      Effort: Pulmonary effort is normal  No respiratory distress  Breath sounds: Normal breath sounds  No wheezing or rhonchi  Abdominal:      General: Abdomen is flat  Bowel sounds are normal  There is no distension  Palpations: Abdomen is soft  Tenderness: There is no abdominal tenderness  There is no guarding  Musculoskeletal: Normal range of motion  Right lower leg: No edema  Left lower leg: No edema  Skin:     General: Skin is warm and dry  Capillary Refill: Capillary refill takes less than 2 seconds  Neurological:      General: No focal deficit present  Mental Status: He is alert and oriented to person, place, and time  Psychiatric:         Mood and Affect: Mood normal          Behavior: Behavior normal          Thought Content:  Thought content normal          Judgment: Judgment normal           [address] [facility] PRIMARY CARE

## 2021-03-09 NOTE — PATIENT INSTRUCTIONS
Problem List Items Addressed This Visit        Other    Iron deficiency anemia    Relevant Orders    CBC and Platelet      Other Visit Diagnoses     Annual physical exam    -  Primary    Screening for lipid disorders        Relevant Orders    Lipid Panel with Direct LDL reflex    Screening for diabetes mellitus        Relevant Orders    Comprehensive metabolic panel    UA (URINE) with reflex to Scope    Screening for thyroid disorder        Relevant Orders    TSH, 3rd generation        COVID-19 Home Care Guidelines    Your healthcare provider and/or public health staff have evaluated you and have determined that you do not need to remain in the hospital at this time  At this time you can be isolated at home where you will be monitored by staff from your local or state health department  You should carefully follow the prevention and isolation steps below until a healthcare provider or local or state health department says that you can return to your normal activities  Stay home except to get medical care    People who are mildly ill with COVID-19 are able to isolate at home during their illness  You should restrict activities outside your home, except for getting medical care  Do not go to work, school, or public areas  Avoid using public transportation, ride-sharing, or taxis  Separate yourself from other people and animals in your home    People: As much as possible, you should stay in a specific room and away from other people in your home  Also, you should use a separate bathroom, if available  Animals: You should restrict contact with pets and other animals while you are sick with COVID-19, just like you would around other people  Although there have not been reports of pets or other animals becoming sick with COVID-19, it is still recommended that people sick with COVID-19 limit contact with animals until more information is known about the virus   When possible, have another member of your household care for your animals while you are sick  If you are sick with COVID-19, avoid contact with your pet, including petting, snuggling, being kissed or licked, and sharing food  If you must care for your pet or be around animals while you are sick, wash your hands before and after you interact with pets and wear a facemask  See COVID-19 and Animals for more information  Call ahead before visiting your doctor    If you have a medical appointment, call the healthcare provider and tell them that you have or may have COVID-19  This will help the healthcare providers office take steps to keep other people from getting infected or exposed  Wear a facemask    You should wear a facemask when you are around other people (e g , sharing a room or vehicle) or pets and before you enter a healthcare providers office  If you are not able to wear a facemask (for example, because it causes trouble breathing), then people who live with you should not stay in the same room with you, or they should wear a facemask if they enter your room  Cover your coughs and sneezes    Cover your mouth and nose with a tissue when you cough or sneeze  Throw used tissues in a lined trash can  Immediately wash your hands with soap and water for at least 20 seconds or, if soap and water are not available, clean your hands with an alcohol-based hand  that contains at least 60% alcohol  Clean your hands often    Wash your hands often with soap and water for at least 20 seconds, especially after blowing your nose, coughing, or sneezing; going to the bathroom; and before eating or preparing food  If soap and water are not readily available, use an alcohol-based hand  with at least 60% alcohol, covering all surfaces of your hands and rubbing them together until they feel dry  Soap and water are the best option if hands are visibly dirty  Avoid touching your eyes, nose, and mouth with unwashed hands      Avoid sharing personal household items    You should not share dishes, drinking glasses, cups, eating utensils, towels, or bedding with other people or pets in your home  After using these items, they should be washed thoroughly with soap and water  Clean all high-touch surfaces everyday    High touch surfaces include counters, tabletops, doorknobs, bathroom fixtures, toilets, phones, keyboards, tablets, and bedside tables  Also, clean any surfaces that may have blood, stool, or body fluids on them  Use a household cleaning spray or wipe, according to the label instructions  Labels contain instructions for safe and effective use of the cleaning product including precautions you should take when applying the product, such as wearing gloves and making sure you have good ventilation during use of the product  Monitor your symptoms    Seek prompt medical attention if your illness is worsening (e g , difficulty breathing)  Before seeking care, call your healthcare provider and tell them that you have, or are being evaluated for, COVID-19  Put on a facemask before you enter the facility  These steps will help the healthcare providers office to keep other people in the office or waiting room from getting infected or exposed  Ask your healthcare provider to call the local or [facility] health department  Persons who are placed under active monitoring or facilitated self-monitoring should follow instructions provided by their local health department or occupational health professionals, as appropriate  If you have a medical emergency and need to call 911, notify the dispatch personnel that you have, or are being evaluated for COVID-19  If possible, put on a facemask before emergency medical services arrive      Discontinuing home isolation    Patients with confirmed COVID-19 should remain under home isolation precautions until the following conditions are met:   - They have had no fever for at least 24 hours (that is one full day of no fever without the use medicine that reduces fevers)  AND  - other symptoms have improved (for example, when their cough or shortness of breath have improved)  AND  - If had mild or moderate illness, at least 10 days have passed since their symptoms first appeared or if severe illness (needed oxygen) or immunosuppressed, at least 20 days have passed since symptoms first appeared  Patients with confirmed COVID-19 should also notify close contacts (including their workplace) and ask that they self-quarantine  Currently, close contact is defined as being within 6 feet for 15 minutes or more from the period 24 hours starting 48 hours before symptom onset to the time at which the patient went into isolation  Close contacts of patients diagnosed with COVID-19 should be instructed by the patient to self-quarantine for 14 days from the last time of their last contact with the patient       Source: [employer]

## 2021-03-09 NOTE — PROGRESS NOTES
Assessment and Plan:    Problem List Items Addressed This Visit     None                 {Assess/PlanSmartLinks:78353}          Subjective:      Patient ID: Bernerd Sacks is a 41 y o  male  CC:    Chief Complaint   Patient presents with    Physical Exam     General physical to include BW  He doesn't want to go through the [facility] clinic anymore  mjs       HPI:    HPI    {Common ambulatory SmartLinks:23100}      Review of Systems      Data to review:       Objective:    Vitals:    21 1105   BP: 120/90   Pulse: 80   Temp: 98 3 °F (36 8 °C)   Weight: 93 9 kg (207 lb)   Height: 5' 11" (1 803 m)        Physical Exam        BMI Counseling: Body mass index is 28 87 kg/m²   The BMI {VB BMI Counselin}

## 2021-03-11 ENCOUNTER — TELEPHONE (OUTPATIENT)
Dept: FAMILY MEDICINE CLINIC | Facility: CLINIC | Age: 46
End: 2021-03-11

## 2021-03-11 NOTE — TELEPHONE ENCOUNTER
Patient has peptic ulcer disease he may not use this orally    Have him use over-the-counter Tylenol he can may use over-the-counter Voltaren gel

## 2021-03-11 NOTE — TELEPHONE ENCOUNTER
TS, Pts insurance has Denied her Rx for Diclofenac Sodium 1 % Gel, According to insurance pt needs to try Diclofenac tabs, Ibuprofen, Indomethacin, Ketoprofen, Meloxicam,Nabutone, Naproxen  Which would you like to change to

## 2021-03-16 NOTE — TELEPHONE ENCOUNTER
LM on VM informing pt of Dr Candelaria's response  I suggested he call back if he has any questions

## 2021-05-25 ENCOUNTER — OFFICE VISIT (OUTPATIENT)
Dept: FAMILY MEDICINE CLINIC | Facility: CLINIC | Age: 46
End: 2021-05-25
Payer: COMMERCIAL

## 2021-05-25 VITALS
HEART RATE: 74 BPM | DIASTOLIC BLOOD PRESSURE: 74 MMHG | TEMPERATURE: 97.8 F | BODY MASS INDEX: 28.42 KG/M2 | HEIGHT: 71 IN | WEIGHT: 203 LBS | SYSTOLIC BLOOD PRESSURE: 104 MMHG

## 2021-05-25 DIAGNOSIS — H60.502 ACUTE OTITIS EXTERNA OF LEFT EAR, UNSPECIFIED TYPE: Primary | ICD-10-CM

## 2021-05-25 PROCEDURE — 99213 OFFICE O/P EST LOW 20 MIN: CPT | Performed by: PHYSICIAN ASSISTANT

## 2021-05-25 RX ORDER — CHOLECALCIFEROL (VITAMIN D3) 25 MCG
CAPSULE ORAL
COMMUNITY

## 2021-05-25 NOTE — PROGRESS NOTES
Assessment and Plan:  Patient Instructions   Assessment/plan:  1  Otitis externa-patient with significant erythema and edema of the left canal   Recommend applying Cortisporin drops every 6 hours for the next 5-7 days  Follow up if symptoms would persist         Problem List Items Addressed This Visit     None      Visit Diagnoses     Acute otitis externa of left ear, unspecified type    -  Primary    Relevant Medications    neomycin-polymyxin-hydrocortisone (CORTISPORIN) otic solution                 Diagnoses and all orders for this visit:    Acute otitis externa of left ear, unspecified type  -     neomycin-polymyxin-hydrocortisone (CORTISPORIN) otic solution; Administer 4 drops into the left ear every 6 (six) hours    Other orders  -     Cholecalciferol (Vitamin D-3) 25 MCG (1000 UT) CAPS; Take by mouth  -     Multiple Vitamins-Minerals (IMMUNE SUPPORT PO); Take by mouth  -     Garlic 10 MG CAPS; Take by mouth  -     Probiotic Product (Pro-biotic Blend) CAPS; Take by mouth              Subjective:      Patient ID: Fidel Saleh is a 40 y o  male  CC:    Chief Complaint   Patient presents with    Earache     Left ear pain that started last Thursday  Pt states he tried Debrox which burned when he applied it  -  lsh       HPI:    HPI:  This is a 27-year-old gentleman that presents to the office with pain and muffling of sound that developed in the past few days in his left ear  He has quite a bit of tenderness when pressing on the outside of the ear  He states that 1 morning he woke and he was picking in the ear subconsciously while he was sleeping and he is not sure if he did any damage  He also tried using Debrox over-the-counter in case it was wax but he did have some burning sensation with it  He has not had any other cold symptoms or signs of infection        The following portions of the patient's history were reviewed and updated as appropriate: allergies, current medications, past family history, past medical history, past social history, past surgical history and problem list       Review of Systems   Constitutional: Negative for fever  HENT: Positive for ear pain  Negative for congestion  Respiratory: Negative for cough, chest tightness and shortness of breath  Cardiovascular: Negative for chest pain  Musculoskeletal: Negative for arthralgias  Data to review:       Objective:    Vitals:    05/25/21 1102   BP: 104/74   BP Location: Left arm   Patient Position: Sitting   Cuff Size: Large   Pulse: 74   Temp: 97 8 °F (36 6 °C)   TempSrc: Oral   Weight: 92 1 kg (203 lb)   Height: 5' 11" (1 803 m)        Physical Exam  Constitutional:       General: He is not in acute distress  Appearance: He is well-developed  HENT:      Head: Normocephalic and atraumatic  Right Ear: Tympanic membrane normal  There is impacted cerumen  Ears:      Comments: Left tympanic membrane is clear, positive cone of light  Left canal is erythematous and edematous with significant tragal motion tenderness  Eyes:      Conjunctiva/sclera: Conjunctivae normal    Neck:      Musculoskeletal: Normal range of motion  Cardiovascular:      Rate and Rhythm: Normal rate and regular rhythm  Pulmonary:      Effort: Pulmonary effort is normal    Abdominal:      General: Abdomen is flat  Bowel sounds are normal  There is no distension  Palpations: Abdomen is soft  There is no mass  Musculoskeletal: Normal range of motion  Skin:     General: Skin is warm  Findings: No rash  Neurological:      Mental Status: He is alert and oriented to person, place, and time     Psychiatric:         Mood and Affect: Mood normal

## 2021-05-25 NOTE — PATIENT INSTRUCTIONS
Assessment/plan:  1  Otitis externa-patient with significant erythema and edema of the left canal   Recommend applying Cortisporin drops every 6 hours for the next 5-7 days    Follow up if symptoms would persist

## 2021-09-01 PROBLEM — E78.5 HYPERLIPIDEMIA: Status: ACTIVE | Noted: 2021-09-01

## 2021-10-20 ENCOUNTER — OFFICE VISIT (OUTPATIENT)
Dept: URGENT CARE | Facility: CLINIC | Age: 46
End: 2021-10-20

## 2021-10-20 VITALS — BODY MASS INDEX: 26.6 KG/M2 | RESPIRATION RATE: 16 BRPM | WEIGHT: 190 LBS | HEIGHT: 71 IN

## 2021-10-20 DIAGNOSIS — J06.9 UPPER RESPIRATORY TRACT INFECTION, UNSPECIFIED TYPE: Primary | ICD-10-CM

## 2021-10-20 PROCEDURE — G0382 LEV 3 HOSP TYPE B ED VISIT: HCPCS | Performed by: PHYSICIAN ASSISTANT

## 2021-10-20 PROCEDURE — U0005 INFEC AGEN DETEC AMPLI PROBE: HCPCS | Performed by: PHYSICIAN ASSISTANT

## 2021-10-20 PROCEDURE — U0003 INFECTIOUS AGENT DETECTION BY NUCLEIC ACID (DNA OR RNA); SEVERE ACUTE RESPIRATORY SYNDROME CORONAVIRUS 2 (SARS-COV-2) (CORONAVIRUS DISEASE [COVID-19]), AMPLIFIED PROBE TECHNIQUE, MAKING USE OF HIGH THROUGHPUT TECHNOLOGIES AS DESCRIBED BY CMS-2020-01-R: HCPCS | Performed by: PHYSICIAN ASSISTANT

## 2021-10-20 RX ORDER — SERTRALINE HYDROCHLORIDE 100 MG/1
TABLET, FILM COATED ORAL
COMMUNITY
Start: 2021-10-19

## 2021-10-21 LAB — SARS-COV-2 RNA RESP QL NAA+PROBE: NEGATIVE

## 2022-04-06 ENCOUNTER — APPOINTMENT (OUTPATIENT)
Dept: RADIOLOGY | Facility: MEDICAL CENTER | Age: 47
End: 2022-04-06
Payer: COMMERCIAL

## 2022-04-06 ENCOUNTER — OFFICE VISIT (OUTPATIENT)
Dept: FAMILY MEDICINE CLINIC | Facility: CLINIC | Age: 47
End: 2022-04-06
Payer: COMMERCIAL

## 2022-04-06 ENCOUNTER — APPOINTMENT (OUTPATIENT)
Dept: LAB | Facility: MEDICAL CENTER | Age: 47
End: 2022-04-06
Payer: COMMERCIAL

## 2022-04-06 VITALS
TEMPERATURE: 97 F | HEART RATE: 70 BPM | BODY MASS INDEX: 26.88 KG/M2 | HEIGHT: 71 IN | WEIGHT: 192 LBS | SYSTOLIC BLOOD PRESSURE: 118 MMHG | DIASTOLIC BLOOD PRESSURE: 78 MMHG

## 2022-04-06 DIAGNOSIS — D50.9 IRON DEFICIENCY ANEMIA, UNSPECIFIED IRON DEFICIENCY ANEMIA TYPE: ICD-10-CM

## 2022-04-06 DIAGNOSIS — E78.5 HYPERLIPIDEMIA, UNSPECIFIED HYPERLIPIDEMIA TYPE: ICD-10-CM

## 2022-04-06 DIAGNOSIS — H60.502 ACUTE OTITIS EXTERNA OF LEFT EAR, UNSPECIFIED TYPE: ICD-10-CM

## 2022-04-06 DIAGNOSIS — M89.8X1 PAIN OF RIGHT CLAVICLE: ICD-10-CM

## 2022-04-06 DIAGNOSIS — F41.9 ANXIETY: ICD-10-CM

## 2022-04-06 DIAGNOSIS — M89.8X1 PAIN OF RIGHT CLAVICLE: Primary | ICD-10-CM

## 2022-04-06 DIAGNOSIS — J30.89 SEASONAL ALLERGIC RHINITIS DUE TO FUNGAL SPORES: ICD-10-CM

## 2022-04-06 PROBLEM — J30.2 SEASONAL ALLERGIC RHINITIS DUE TO FUNGAL SPORES: Status: ACTIVE | Noted: 2022-04-06

## 2022-04-06 LAB
CHOLEST SERPL-MCNC: 203 MG/DL
HDLC SERPL-MCNC: 84 MG/DL
LDLC SERPL CALC-MCNC: 110 MG/DL (ref 0–100)
TRIGL SERPL-MCNC: 43 MG/DL

## 2022-04-06 PROCEDURE — 73000 X-RAY EXAM OF COLLAR BONE: CPT

## 2022-04-06 PROCEDURE — 36415 COLL VENOUS BLD VENIPUNCTURE: CPT

## 2022-04-06 PROCEDURE — 80061 LIPID PANEL: CPT

## 2022-04-06 PROCEDURE — 99214 OFFICE O/P EST MOD 30 MIN: CPT | Performed by: NURSE PRACTITIONER

## 2022-04-06 RX ORDER — FLUTICASONE PROPIONATE 50 MCG
1 SPRAY, SUSPENSION (ML) NASAL DAILY
Qty: 11.1 ML | Refills: 3 | Status: SHIPPED | OUTPATIENT
Start: 2022-04-06
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
## 2022-04-06 NOTE — ASSESSMENT & PLAN NOTE
Cortisporin drops ordered to treat otitis externa  If no improvement is noted patient will be referred to ENT specialist as this has been a recurrent issue for the patient  Patient was advised to use cotton while showering to prevent water from entering the ear

## 2022-04-06 NOTE — ASSESSMENT & PLAN NOTE
X-ray of the right clavicle ordered to assess for any acute fractures or other abnormalities which could explain continued clavicular pain

## 2022-04-11 ENCOUNTER — TELEPHONE (OUTPATIENT)
Dept: FAMILY MEDICINE CLINIC | Facility: CLINIC | Age: 47
End: 2022-04-11

## 2022-04-11 NOTE — TELEPHONE ENCOUNTER
PATIENT CALLING FOR HIS XR RESULTS  PER CHRIS, RESULTS WERE NORMAL   PATIENT ASKING WHAT THAT MEANS, WOULD LIKE TO KNOW IF IT IS JUST WEAR AND TEAR AND WHAT HE SHOULD DO? PLEASE CALL PATIENT TO ADVISE

## 2022-04-12 NOTE — TELEPHONE ENCOUNTER
Normal as in no fractures or signs of osteoarthritis  This means that his pain is most likely muscular in this area  I would recommend that he see PT at this point if he is agreeable

## 2022-04-12 NOTE — TELEPHONE ENCOUNTER
Called pt back, pt states he doesn't know if he wants to go through physical therapy he will have to call his insurance and call back
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
## 2022-04-12 NOTE — TELEPHONE ENCOUNTER
Called pt, left message in detail and for pt to return call of what does he want to do moving forward?

## 2022-04-14 ENCOUNTER — TELEPHONE (OUTPATIENT)
Dept: FAMILY MEDICINE CLINIC | Facility: CLINIC | Age: 47
End: 2022-04-14

## 2022-04-14 DIAGNOSIS — M25.511 ACUTE PAIN OF RIGHT SHOULDER: Primary | ICD-10-CM

## 2022-04-14 DIAGNOSIS — M89.8X1 PAIN OF RIGHT CLAVICLE: ICD-10-CM

## 2022-04-14 NOTE — TELEPHONE ENCOUNTER
PATIENT CALLED IN STATED HE WOULD LIKE PROVIDER TO PUT IN A REFERRAL FOR PHYSICAL THERAPY, PLEASE CALL PATIENT WHEN ORDER IS IN CHART

## 2022-04-26 ENCOUNTER — EVALUATION (OUTPATIENT)
Dept: PHYSICAL THERAPY | Facility: CLINIC | Age: 47
End: 2022-04-26
Payer: COMMERCIAL

## 2022-04-26 DIAGNOSIS — M25.511 ACUTE PAIN OF RIGHT SHOULDER: ICD-10-CM

## 2022-04-26 DIAGNOSIS — M54.2 NECK PAIN: ICD-10-CM

## 2022-04-26 DIAGNOSIS — M89.8X1 PAIN OF RIGHT CLAVICLE: Primary | ICD-10-CM

## 2022-04-26 PROCEDURE — 97162 PT EVAL MOD COMPLEX 30 MIN: CPT | Performed by: PHYSICAL THERAPIST

## 2022-04-26 PROCEDURE — 97110 THERAPEUTIC EXERCISES: CPT | Performed by: PHYSICAL THERAPIST
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
## 2022-10-11 PROBLEM — H60.502 ACUTE OTITIS EXTERNA OF LEFT EAR: Status: RESOLVED | Noted: 2022-04-06 | Resolved: 2022-10-11

## 2022-11-15 ENCOUNTER — TELEPHONE (OUTPATIENT)
Dept: FAMILY MEDICINE CLINIC | Facility: CLINIC | Age: 47
End: 2022-11-15
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
## 2022-11-16 NOTE — TELEPHONE ENCOUNTER
Patient stated he needs routine BW  Pt was made aware he was due for a follow up and an appointment was scheduled

## 2023-01-03 PROBLEM — M89.8X1 PAIN OF RIGHT CLAVICLE: Status: RESOLVED | Noted: 2022-04-06 | Resolved: 2023-01-03

## 2023-01-03 PROBLEM — M25.511 ACUTE PAIN OF RIGHT SHOULDER: Status: RESOLVED | Noted: 2020-06-30 | Resolved: 2023-01-03

## 2023-01-09 ENCOUNTER — TELEPHONE (OUTPATIENT)
Dept: ADMINISTRATIVE | Facility: OTHER | Age: 48
End: 2023-01-09

## 2023-01-09 ENCOUNTER — OFFICE VISIT (OUTPATIENT)
Dept: FAMILY MEDICINE CLINIC | Facility: CLINIC | Age: 48
End: 2023-01-09

## 2023-01-09 ENCOUNTER — APPOINTMENT (OUTPATIENT)
Dept: LAB | Facility: CLINIC | Age: 48
End: 2023-01-09

## 2023-01-09 VITALS
SYSTOLIC BLOOD PRESSURE: 120 MMHG | RESPIRATION RATE: 16 BRPM | WEIGHT: 184 LBS | HEART RATE: 80 BPM | DIASTOLIC BLOOD PRESSURE: 84 MMHG | BODY MASS INDEX: 25.76 KG/M2 | HEIGHT: 71 IN

## 2023-01-09 DIAGNOSIS — M54.2 NECK PAIN: ICD-10-CM

## 2023-01-09 DIAGNOSIS — D50.9 IRON DEFICIENCY ANEMIA, UNSPECIFIED IRON DEFICIENCY ANEMIA TYPE: ICD-10-CM

## 2023-01-09 DIAGNOSIS — Z12.11 SCREEN FOR COLON CANCER: ICD-10-CM

## 2023-01-09 DIAGNOSIS — J30.1 NON-SEASONAL ALLERGIC RHINITIS DUE TO POLLEN: ICD-10-CM

## 2023-01-09 DIAGNOSIS — E78.00 PURE HYPERCHOLESTEROLEMIA: ICD-10-CM

## 2023-01-09 DIAGNOSIS — F33.40 RECURRENT MAJOR DEPRESSIVE DISORDER, IN REMISSION (HCC): ICD-10-CM

## 2023-01-09 DIAGNOSIS — Z00.00 HEALTH CARE MAINTENANCE: ICD-10-CM

## 2023-01-09 DIAGNOSIS — Z11.4 SCREENING FOR HIV (HUMAN IMMUNODEFICIENCY VIRUS): ICD-10-CM

## 2023-01-09 DIAGNOSIS — F41.9 ANXIETY: ICD-10-CM

## 2023-01-09 DIAGNOSIS — E66.3 OVERWEIGHT: ICD-10-CM

## 2023-01-09 DIAGNOSIS — K21.00 GASTROESOPHAGEAL REFLUX DISEASE WITH ESOPHAGITIS WITHOUT HEMORRHAGE: ICD-10-CM

## 2023-01-09 DIAGNOSIS — Z11.59 NEED FOR HEPATITIS C SCREENING TEST: ICD-10-CM

## 2023-01-09 DIAGNOSIS — K26.9 DUODENAL ULCER: ICD-10-CM

## 2023-01-09 DIAGNOSIS — Z12.11 SCREENING FOR COLON CANCER: ICD-10-CM

## 2023-01-09 DIAGNOSIS — D50.9 IRON DEFICIENCY ANEMIA, UNSPECIFIED IRON DEFICIENCY ANEMIA TYPE: Primary | ICD-10-CM

## 2023-01-09 LAB
ALBUMIN SERPL BCP-MCNC: 3.9 G/DL (ref 3.5–5)
ALP SERPL-CCNC: 59 U/L (ref 46–116)
ALT SERPL W P-5'-P-CCNC: 20 U/L (ref 12–78)
ANION GAP SERPL CALCULATED.3IONS-SCNC: 1 MMOL/L (ref 4–13)
AST SERPL W P-5'-P-CCNC: 12 U/L (ref 5–45)
BILIRUB SERPL-MCNC: 0.81 MG/DL (ref 0.2–1)
BILIRUB UR QL STRIP: NEGATIVE
BUN SERPL-MCNC: 8 MG/DL (ref 5–25)
CALCIUM SERPL-MCNC: 10 MG/DL (ref 8.3–10.1)
CHLORIDE SERPL-SCNC: 100 MMOL/L (ref 96–108)
CHOLEST SERPL-MCNC: 198 MG/DL
CLARITY UR: CLEAR
CO2 SERPL-SCNC: 31 MMOL/L (ref 21–32)
COLOR UR: COLORLESS
CREAT SERPL-MCNC: 0.72 MG/DL (ref 0.6–1.3)
ERYTHROCYTE [DISTWIDTH] IN BLOOD BY AUTOMATED COUNT: 13.4 % (ref 11.6–15.1)
FERRITIN SERPL-MCNC: 247 NG/ML (ref 8–388)
GFR SERPL CREATININE-BSD FRML MDRD: 111 ML/MIN/1.73SQ M
GLUCOSE P FAST SERPL-MCNC: 109 MG/DL (ref 65–99)
GLUCOSE UR STRIP-MCNC: NEGATIVE MG/DL
HCT VFR BLD AUTO: 46.1 % (ref 36.5–49.3)
HCV AB SER QL: NORMAL
HDLC SERPL-MCNC: 90 MG/DL
HGB BLD-MCNC: 15.6 G/DL (ref 12–17)
HGB UR QL STRIP.AUTO: NEGATIVE
IRON SERPL-MCNC: 137 UG/DL (ref 65–175)
KETONES UR STRIP-MCNC: NEGATIVE MG/DL
LDLC SERPL CALC-MCNC: 90 MG/DL (ref 0–100)
LEUKOCYTE ESTERASE UR QL STRIP: NEGATIVE
MCH RBC QN AUTO: 30.5 PG (ref 26.8–34.3)
MCHC RBC AUTO-ENTMCNC: 33.8 G/DL (ref 31.4–37.4)
MCV RBC AUTO: 90 FL (ref 82–98)
NITRITE UR QL STRIP: NEGATIVE
PH UR STRIP.AUTO: 7 [PH]
PLATELET # BLD AUTO: 247 THOUSANDS/UL (ref 149–390)
PMV BLD AUTO: 10.7 FL (ref 8.9–12.7)
POTASSIUM SERPL-SCNC: 5.3 MMOL/L (ref 3.5–5.3)
PROT SERPL-MCNC: 7.8 G/DL (ref 6.4–8.4)
PROT UR STRIP-MCNC: NEGATIVE MG/DL
RBC # BLD AUTO: 5.11 MILLION/UL (ref 3.88–5.62)
SODIUM SERPL-SCNC: 132 MMOL/L (ref 135–147)
SP GR UR STRIP.AUTO: 1.01 (ref 1–1.03)
TRIGL SERPL-MCNC: 89 MG/DL
TSH SERPL DL<=0.05 MIU/L-ACNC: 1.55 UIU/ML (ref 0.45–4.5)
UROBILINOGEN UR STRIP-ACNC: <2 MG/DL
WBC # BLD AUTO: 5.42 THOUSAND/UL (ref 4.31–10.16)

## 2023-01-09 NOTE — TELEPHONE ENCOUNTER
Upon review of the In Basket request we were able to locate, review, and update the patient chart as requested for CRC: FIT/FOBT (3)  Any additional questions or concerns should be emailed to the Practice Liaisons via the appropriate education email address, please do not reply via In Basket      Thank you  Christy Amador MA

## 2023-01-09 NOTE — LETTER
Lab Result(s) Request Form: Cologuard      Date Requested: 23  Patient: Rissa Dione  Patient : 1975   Referring Provider: Juanito Jimenez, DO        Date of Lab Collection ______________________________       The above patient has informed us that they have completed their   most recent Cologuard at your facility  Please complete   this form and attach all corresponding procedure reports/results  Comments __________________________________________________________  ____________________________________________________________________  ____________________________________________________________________  ____________________________________________________________________    Collecting/Resulting Facility  ___________________________________________  Form Completed By (print name) ________________________________________    Signature ___________________________________________________________      These reports are needed for  compliance  Please fax this completed form and a copy of the lab result(s)/ report(s) to our office located at [place] [address] as soon as possible to [phone] kirk Umanzor: Phone [phone]    We thank you for your assistance in treating our mutual patient

## 2023-01-09 NOTE — PROGRESS NOTES
Name: Ashley Jhaveri      : 1975      MRN: [id number]  Encounter Provider: Tina Braun DO  Encounter Date: 2023   Encounter department: [facility] PRIMARY CARE    Assessment & Plan      1  Iron deficiency anemia  2  Peptic ulcer disease  3  GERD   This was all    Blood work ordered  Resolved  4  Anxiety  5  MDD, stable on sertraline follows with specialist   6  Allergic rhinitis, stable Flonase  7  Neck pain/ shoulder pain, follows with [facility]  8  Screening colon cancer  Patient states he had negative Cologuard with the [place]  will obtain this report  9  Health care maintenance discussed hepatitis-C and HIV screening, blood work ordered  10  BMI 25 66 patient lost 6 lb continue diet exercise weight loss  9  Return in 1 year sooner if needed      1  Iron deficiency anemia, unspecified iron deficiency anemia type  Assessment & Plan:   Was worked up by GI 2019, blood work ordered    Orders:  -     CBC; Future; Expected date: 2023  -     Comprehensive metabolic panel; Future; Expected date: 2023  -     Lipid Panel with Direct LDL reflex; Future; Expected date: 2023  -     TSH, 3rd generation with Free T4 reflex; Future; Expected date: 2023  -     UA (URINE) with reflex to Scope; Future; Expected date: 2023  -     Ferritin; Future; Expected date: 2023  -     Iron; Future; Expected date: 2023    2  Screen for colon cancer  -     CBC; Future; Expected date: 2023  -     Comprehensive metabolic panel; Future; Expected date: 2023  -     Lipid Panel with Direct LDL reflex; Future; Expected date: 2023  -     TSH, 3rd generation with Free T4 reflex; Future; Expected date: 2023  -     UA (URINE) with reflex to Scope; Future; Expected date: 2023  -     Ferritin; Future; Expected date: 2023  -     Iron; Future; Expected date: 2023    3   Anxiety  Assessment & Plan:   Stable sees Psychiatry    Orders:  -     CBC; Future; Expected date: 01/09/2023  -     Comprehensive metabolic panel; Future; Expected date: 01/09/2023  -     Lipid Panel with Direct LDL reflex; Future; Expected date: 01/09/2023  -     TSH, 3rd generation with Free T4 reflex; Future; Expected date: 01/09/2023  -     UA (URINE) with reflex to Scope; Future; Expected date: 01/09/2023  -     Ferritin; Future; Expected date: 01/09/2023  -     Iron; Future; Expected date: 01/09/2023    4  Pure hypercholesterolemia  Assessment & Plan:   Blood work ordered    Orders:  -     CBC; Future; Expected date: 01/09/2023  -     Comprehensive metabolic panel; Future; Expected date: 01/09/2023  -     Lipid Panel with Direct LDL reflex; Future; Expected date: 01/09/2023  -     TSH, 3rd generation with Free T4 reflex; Future; Expected date: 01/09/2023  -     UA (URINE) with reflex to Scope; Future; Expected date: 01/09/2023  -     Ferritin; Future; Expected date: 01/09/2023  -     Iron; Future; Expected date: 01/09/2023    5  Gastroesophageal reflux disease with esophagitis without hemorrhage  Assessment & Plan:   Resolved, on no medication    Orders:  -     CBC; Future; Expected date: 01/09/2023  -     Comprehensive metabolic panel; Future; Expected date: 01/09/2023  -     Lipid Panel with Direct LDL reflex; Future; Expected date: 01/09/2023  -     TSH, 3rd generation with Free T4 reflex; Future; Expected date: 01/09/2023  -     UA (URINE) with reflex to Scope; Future; Expected date: 01/09/2023  -     Ferritin; Future; Expected date: 01/09/2023  -     Iron; Future; Expected date: 01/09/2023    6  Duodenal ulcer  Assessment & Plan:   resolved    Orders:  -     CBC; Future; Expected date: 01/09/2023  -     Comprehensive metabolic panel; Future; Expected date: 01/09/2023  -     Lipid Panel with Direct LDL reflex; Future; Expected date: 01/09/2023  -     TSH, 3rd generation with Free T4 reflex; Future; Expected date: 01/09/2023  -     UA (URINE) with reflex to Scope;  Future; Expected date: 01/09/2023  -     Ferritin; Future; Expected date: 01/09/2023  -     Iron; Future; Expected date: 01/09/2023    7  Non-seasonal allergic rhinitis due to pollen  Assessment & Plan:   Stable on Flonase    Orders:  -     CBC; Future; Expected date: 01/09/2023  -     Comprehensive metabolic panel; Future; Expected date: 01/09/2023  -     Lipid Panel with Direct LDL reflex; Future; Expected date: 01/09/2023  -     TSH, 3rd generation with Free T4 reflex; Future; Expected date: 01/09/2023  -     UA (URINE) with reflex to Scope; Future; Expected date: 01/09/2023  -     Ferritin; Future; Expected date: 01/09/2023  -     Iron; Future; Expected date: 01/09/2023    8  Overweight  Assessment & Plan:   Low-fat diet recommended    Orders:  -     CBC; Future; Expected date: 01/09/2023  -     Comprehensive metabolic panel; Future; Expected date: 01/09/2023  -     Lipid Panel with Direct LDL reflex; Future; Expected date: 01/09/2023  -     TSH, 3rd generation with Free T4 reflex; Future; Expected date: 01/09/2023  -     UA (URINE) with reflex to Scope; Future; Expected date: 01/09/2023  -     Ferritin; Future; Expected date: 01/09/2023  -     Iron; Future; Expected date: 01/09/2023    9  Neck pain  Assessment & Plan:   Follows with [facility]    Orders:  -     CBC; Future; Expected date: 01/09/2023  -     Comprehensive metabolic panel; Future; Expected date: 01/09/2023  -     Lipid Panel with Direct LDL reflex; Future; Expected date: 01/09/2023  -     TSH, 3rd generation with Free T4 reflex; Future; Expected date: 01/09/2023  -     UA (URINE) with reflex to Scope; Future; Expected date: 01/09/2023  -     Ferritin; Future; Expected date: 01/09/2023  -     Iron; Future; Expected date: 01/09/2023    10  Recurrent major depressive disorder, in remission [facility])  Assessment & Plan:   Stable on Zoloft with Psychiatry doing well    Orders:  -     CBC; Future; Expected date: 01/09/2023  -     Comprehensive metabolic panel;  Future; Expected date: 01/09/2023  -     Lipid Panel with Direct LDL reflex; Future; Expected date: 01/09/2023  -     TSH, 3rd generation with Free T4 reflex; Future; Expected date: 01/09/2023  -     UA (URINE) with reflex to Scope; Future; Expected date: 01/09/2023  -     Ferritin; Future; Expected date: 01/09/2023  -     Iron; Future; Expected date: 01/09/2023    11  Screening for colon cancer  Assessment & Plan:   Patient states he had negative Cologuard with the [place] 11/22 will try and obtain report    Orders:  -     CBC; Future; Expected date: 01/09/2023  -     Comprehensive metabolic panel; Future; Expected date: 01/09/2023  -     Lipid Panel with Direct LDL reflex; Future; Expected date: 01/09/2023  -     TSH, 3rd generation with Free T4 reflex; Future; Expected date: 01/09/2023  -     UA (URINE) with reflex to Scope; Future; Expected date: 01/09/2023  -     Ferritin; Future; Expected date: 01/09/2023  -     Iron; Future; Expected date: 01/09/2023    12  Health care maintenance  Assessment & Plan:   Hepatitis-C and HIV screen discussed orders placed    Orders:  -     CBC; Future; Expected date: 01/09/2023  -     Comprehensive metabolic panel; Future; Expected date: 01/09/2023  -     Lipid Panel with Direct LDL reflex; Future; Expected date: 01/09/2023  -     TSH, 3rd generation with Free T4 reflex; Future; Expected date: 01/09/2023  -     UA (URINE) with reflex to Scope; Future; Expected date: 01/09/2023  -     Ferritin; Future; Expected date: 01/09/2023  -     Iron; Future; Expected date: 01/09/2023    13  Need for hepatitis C screening test  -     Hepatitis C Antibody ([facility]); Future; Expected date: 01/09/2023  -     CBC; Future; Expected date: 01/09/2023  -     Comprehensive metabolic panel; Future; Expected date: 01/09/2023  -     Lipid Panel with Direct LDL reflex; Future; Expected date: 01/09/2023  -     TSH, 3rd generation with Free T4 reflex;  Future; Expected date: 01/09/2023  -     UA (URINE) with reflex to Scope; Future; Expected date: 01/09/2023  -     Ferritin; Future; Expected date: 01/09/2023  -     Iron; Future; Expected date: 01/09/2023    14  Screening for HIV (human immunodeficiency virus)  -     HIV 1/2 AG/AB w Reflex [facility] for 2 yr old and above; Future; Expected date: 01/09/2023  -     CBC; Future; Expected date: 01/09/2023  -     Comprehensive metabolic panel; Future; Expected date: 01/09/2023  -     Lipid Panel with Direct LDL reflex; Future; Expected date: 01/09/2023  -     TSH, 3rd generation with Free T4 reflex; Future; Expected date: 01/09/2023  -     UA (URINE) with reflex to Scope; Future; Expected date: 01/09/2023  -     Ferritin; Future; Expected date: 01/09/2023  -     Iron; Future; Expected date: 01/09/2023      BMI Counseling: Body mass index is 25 66 kg/m²  The BMI is above normal  Nutrition recommendations include moderation in carbohydrate intake and reducing intake of cholesterol  Exercise recommendations include exercising 3-5 times per week  Rationale for BMI follow-up plan is due to patient being overweight or obese  Subjective       Patient would like blood work ordered for him  Patient goes to the [place]  Patient tells me he had a negative Cologuard with the [place] 11/22  Will obtain that result  Patient lost 6 lb since last office visit    Review of Systems   Constitutional: Negative  HENT: Negative  Eyes: Negative  Respiratory: Negative  Cardiovascular: Negative  Gastrointestinal:         HPI   Endocrine: Negative  Genitourinary: Negative  Musculoskeletal: Negative  Skin: Negative  Allergic/Immunologic: Negative  Neurological: Negative  Hematological: Negative  Psychiatric/Behavioral: Negative          Current Outpatient Medications on File Prior to Visit   Medication Sig   • Cholecalciferol (Vitamin D-3) 25 MCG (1000 UT) CAPS Take by mouth   • fluticasone (FLONASE) 50 mcg/act nasal spray 1 spray into each nostril daily   • Garlic 10 MG CAPS Take by mouth   • Multiple Vitamins-Minerals (IMMUNE SUPPORT PO) Take by mouth   • sertraline (ZOLOFT) 100 mg tablet TAKE ONE TABLET BY MOUTH EVERY DAY FOR MOOD   • [DISCONTINUED] neomycin-polymyxin-hydrocortisone (CORTISPORIN) otic solution Administer 4 drops into the left ear every 6 (six) hours       Objective     /84   Pulse 80   Resp 16   Ht 5' 11" (1 803 m)   Wt 83 5 kg (184 lb)   BMI 25 66 kg/m²     Physical Exam  Vitals and nursing note reviewed  Constitutional:       Appearance: Normal appearance  HENT:      Head: Normocephalic and atraumatic  Right Ear: Tympanic membrane normal       Left Ear: Tympanic membrane normal       Nose: Nose normal       Mouth/Throat:      Mouth: Mucous membranes are moist       Pharynx: Oropharynx is clear  No oropharyngeal exudate or posterior oropharyngeal erythema  Eyes:      General: No scleral icterus  Neck:      Vascular: No carotid bruit  Cardiovascular:      Rate and Rhythm: Normal rate and regular rhythm  Heart sounds: Normal heart sounds  Pulmonary:      Effort: Pulmonary effort is normal       Breath sounds: Normal breath sounds  Abdominal:      General: Bowel sounds are normal       Palpations: Abdomen is soft  Tenderness: There is no abdominal tenderness  Musculoskeletal:      Cervical back: Neck supple  Right lower leg: No edema  Left lower leg: No edema  Skin:     General: Skin is warm and dry  Neurological:      General: No focal deficit present  Mental Status: He is alert     Psychiatric:         Mood and Affect: Mood normal        Kory Candelaria DO

## 2023-01-09 NOTE — TELEPHONE ENCOUNTER
Upon review of the In Basket request and the patient's chart, initial outreach has been made via fax to facility  Please see Contacts section for details       Thank you  Marko Waters MA

## 2023-01-10 DIAGNOSIS — E87.1 HYPONATREMIA: Primary | ICD-10-CM

## 2023-01-10 DIAGNOSIS — R73.9 HYPERGLYCEMIA: ICD-10-CM

## 2023-01-10 LAB
HIV 1+2 AB+HIV1 P24 AG SERPL QL IA: NORMAL
HIV 2 AB SERPL QL IA: NORMAL
HIV1 AB SERPL QL IA: NORMAL
HIV1 P24 AG SERPL QL IA: NORMAL

## 2023-03-10 PROBLEM — Z00.00 HEALTH CARE MAINTENANCE: Status: RESOLVED | Noted: 2023-01-09 | Resolved: 2023-03-10

## 2023-03-10 PROBLEM — Z12.11 SCREENING FOR COLON CANCER: Status: RESOLVED | Noted: 2023-01-09 | Resolved: 2023-03-10

## 2023-08-28 ENCOUNTER — OFFICE VISIT (OUTPATIENT)
Dept: URGENT CARE | Facility: CLINIC | Age: 48
End: 2023-08-28
Payer: COMMERCIAL

## 2023-08-28 VITALS
BODY MASS INDEX: 24.91 KG/M2 | OXYGEN SATURATION: 97 % | WEIGHT: 174 LBS | DIASTOLIC BLOOD PRESSURE: 91 MMHG | SYSTOLIC BLOOD PRESSURE: 124 MMHG | HEART RATE: 67 BPM | RESPIRATION RATE: 18 BRPM | TEMPERATURE: 97.1 F | HEIGHT: 70 IN

## 2023-08-28 DIAGNOSIS — J06.9 ACUTE URI: Primary | ICD-10-CM

## 2023-08-28 LAB
SARS-COV-2 AG UPPER RESP QL IA: NEGATIVE
VALID CONTROL: NORMAL

## 2023-08-28 PROCEDURE — 99213 OFFICE O/P EST LOW 20 MIN: CPT | Performed by: NURSE PRACTITIONER

## 2023-08-28 PROCEDURE — 87811 SARS-COV-2 COVID19 W/OPTIC: CPT | Performed by: NURSE PRACTITIONER

## 2023-08-28 NOTE — PROGRESS NOTES
[facility]        NAME: Sona Whitman is a 53 y.o. male  : 1975    MRN: [id number]  DATE: 2023  TIME: 2:39 PM    Assessment and Plan   Acute URI [J06.9]  1. Acute URI  Poct Covid 19 Rapid Antigen Test        Rapid covid negative   Reviewed viral uri   Discussed otc medications for symptom management   Work note given   F/u with pcp     Patient Instructions     Follow up with PCP in 3-5 days. Proceed to  ER if symptoms worsen. Chief Complaint     Chief Complaint   Patient presents with   • Cold Like Symptoms     Patient with c/o congestion, hot and cold feeling, and a cough since Saturday         History of Present Illness   Sona Whitman presents to the clinic c/o    Cold Like Symptoms (Patient with c/o congestion, hot and cold feeling, and a cough since Saturday)  Sat started with scratchy throat,  noted some congestion and itching in the back of throat - thought was related to allergies    night started sneezing more. Today at work noted body aches, chills, sweating   No known fevers. Hasn't taken anything yet      Review of Systems   Review of Systems   All other systems reviewed and are negative.         Current Medications     Long-Term Medications   Medication Sig Dispense Refill   • Cholecalciferol (Vitamin D-3) 25 MCG (1000 UT) CAPS Take by mouth     • fluticasone (FLONASE) 50 mcg/act nasal spray 1 spray into each nostril daily 11.1 mL 3   • sertraline (ZOLOFT) 100 mg tablet TAKE ONE TABLET BY MOUTH EVERY DAY FOR MOOD         Current Allergies     Allergies as of 2023 - Reviewed 2023   Allergen Reaction Noted   • Paxil [paroxetine] Anxiety 2019            The following portions of the patient's history were reviewed and updated as appropriate: allergies, current medications, past family history, past medical history, past social history, past surgical history and problem list.    Objective   /91   Pulse 67   Temp (!) 97.1 °F (36.2 °C) (Tympanic)   Resp 18   Ht 5' 10" (1.778 m)   Wt 78.9 kg (174 lb)   SpO2 97%   BMI 24.97 kg/m²        Physical Exam     Physical Exam  Vitals and nursing note reviewed. Constitutional:       Appearance: Normal appearance. He is well-developed. HENT:      Head: Normocephalic and atraumatic. Right Ear: Tympanic membrane, ear canal and external ear normal.      Left Ear: Tympanic membrane, ear canal and external ear normal.      Nose: Mucosal edema and congestion present. Right Sinus: No maxillary sinus tenderness or frontal sinus tenderness. Left Sinus: No maxillary sinus tenderness or frontal sinus tenderness. Mouth/Throat:      Mouth: Mucous membranes are moist.   Eyes:      General: Lids are normal.      Extraocular Movements: Extraocular movements intact. Conjunctiva/sclera: Conjunctivae normal.      Pupils: Pupils are equal, round, and reactive to light. Cardiovascular:      Rate and Rhythm: Normal rate and regular rhythm. Pulses: Normal pulses. Heart sounds: Normal heart sounds, S1 normal and S2 normal.   Pulmonary:      Effort: Pulmonary effort is normal.      Breath sounds: Normal breath sounds. Abdominal:      General: Abdomen is flat. Bowel sounds are normal.      Palpations: Abdomen is soft. Musculoskeletal:      Cervical back: Normal range of motion and neck supple. Skin:     General: Skin is warm and dry. Neurological:      Mental Status: He is alert. Psychiatric:         Speech: Speech normal.         Behavior: Behavior normal.         Thought Content:  Thought content normal.         Judgment: Judgment normal.

## 2023-08-28 NOTE — PATIENT INSTRUCTIONS
Aleve cold and sinus - ask the pharmacist for the medication  Viral Syndrome   AMBULATORY CARE:   Viral syndrome  is a term used for symptoms of an infection caused by a virus. Viruses are spread easily from person to person on shared items. Signs and symptoms  may start slowly or suddenly and last hours to days. They can be mild to severe and can change over days or hours. You may have any of the following:  Fever and chills    A runny or stuffy nose    Cough, sore throat, or hoarseness    Headache, or pain and pressure around your eyes    Muscle aches and joint pain    Shortness of breath or wheezing    Abdominal pain, cramps, and diarrhea    Nausea, vomiting, or loss of appetite    Call your local emergency number (911 in the [address]), or have someone call if:   You have a seizure. You cannot be woken. You have chest pain or trouble breathing. Seek care immediately if:   You have a stiff neck, a bad headache, and sensitivity to light. You feel weak, dizzy, or confused. You stop urinating or urinate a lot less than usual.    You cough up blood or thick yellow or green mucus. You have severe abdominal pain or your abdomen is larger than usual.    Call your doctor if:   Your symptoms do not get better with treatment or get worse after 3 days. You have a rash or ear pain. You have burning when you urinate. You have questions or concerns about your condition or care. Treatment for viral syndrome  may include medicines to manage your symptoms. Antibiotics are not given for a viral infection. You may  need any of the following:  Acetaminophen  decreases pain and fever. It is available without a doctor's order. Ask how much to take and how often to take it. Follow directions. Read the labels of all other medicines you are using to see if they also contain acetaminophen, or ask your doctor or pharmacist. Acetaminophen can cause liver damage if not taken correctly.     NSAIDs , such as ibuprofen, help decrease swelling, pain, and fever. NSAIDs can cause stomach bleeding or kidney problems in certain people. If you take blood thinner medicine, always ask your healthcare provider if NSAIDs are safe for you. Always read the medicine label and follow directions. Cold medicine  helps decrease swelling, control a cough, and relieve chest or nasal congestion. Saline nasal spray  helps decrease nasal congestion. Manage your symptoms:   Drink liquids as directed to prevent dehydration. Ask how much liquid to drink each day and which liquids are best for you. Do not drink liquids with caffeine. Caffeine can make dehydration worse. Get plenty of rest to help your body heal.  Take naps throughout the day. Ask your healthcare provider when you can return to work and your normal activities. Use a cool mist humidifier  to increase air moisture in your home. This may make it easier for you to breathe and help decrease your cough. Drink tea with honey or use cough drops for a sore throat. Cough drops are available without a doctor's order. Follow directions for taking cough drops. Do not smoke or be close to anyone who is smoking. Nicotine and other chemicals in cigarettes and cigars can cause lung damage. Smoking can also delay healing. Ask your healthcare provider for information if you currently smoke and need help to quit. E-cigarettes or smokeless tobacco still contain nicotine. Talk to your healthcare provider before you use these products. Prevent the spread of germs:   Wash your hands often throughout the day. Use soap and water. Rub your soapy hands together, lacing your fingers, for at least 20 seconds. Rinse with warm, running water. Dry your hands with a clean towel or paper towel. Use hand  that contains alcohol if soap and water are not available. Teach children how to wash their hands and use hand . Cover sneezes and coughs.   Turn your face away and cover your mouth and nose with a tissue. Throw the tissue away. Use the bend of your arm if a tissue is not available. Then wash your hands well with soap and water or use hand . Teach children how to cover a cough or sneeze. Stay home while you are sick. Avoid crowds as much as possible. Get the influenza (flu) vaccine as soon as recommended each year. The flu vaccine is available starting in September or October. Ask your healthcare provider about the pneumonia vaccine. This vaccine is usually recommended every 5 years in older adults. Follow up with your doctor as directed:  Write down your questions so you remember to ask them during your visits. © Copyright [facility] 2022 Information is for End User's use only and may not be sold, redistributed or otherwise used for commercial purposes. The above information is an  only. It is not intended as medical advice for individual conditions or treatments. Talk to your doctor, nurse or pharmacist before following any medical regimen to see if it is safe and effective for you.

## 2023-08-28 NOTE — LETTER
August 28, 2023     Patient: Serenity Rodriguez   YOB: 1975   Date of Visit: 8/28/2023       To Whom it May Concern:    Dariela Quiros was seen in my clinic on 8/28/2023. He may return to work on when fever free for 24 hours without the use of fever reducing medication . If you have any questions or concerns, please don't hesitate to call.          Sincerely,          ERIN Marin        CC: No Recipients

## 2023-11-28 PROBLEM — D50.9 IRON DEFICIENCY ANEMIA: Status: ACTIVE | Noted: 2023-11-28

## 2023-11-28 PROBLEM — F10.10 ALCOHOL ABUSE: Status: ACTIVE | Noted: 2023-11-28

## 2023-11-28 PROBLEM — F41.1 GAD (GENERALIZED ANXIETY DISORDER): Status: ACTIVE | Noted: 2023-11-28

## 2023-12-28 ENCOUNTER — APPOINTMENT (OUTPATIENT)
Dept: LAB | Facility: CLINIC | Age: 48
End: 2023-12-28
Payer: OTHER GOVERNMENT

## 2023-12-28 ENCOUNTER — TELEPHONE (OUTPATIENT)
Dept: FAMILY MEDICINE CLINIC | Facility: CLINIC | Age: 48
End: 2023-12-28

## 2023-12-28 DIAGNOSIS — E87.1 HYPONATREMIA: ICD-10-CM

## 2023-12-28 DIAGNOSIS — D50.9 IRON DEFICIENCY ANEMIA, UNSPECIFIED IRON DEFICIENCY ANEMIA TYPE: ICD-10-CM

## 2023-12-28 DIAGNOSIS — E78.00 PURE HYPERCHOLESTEROLEMIA: Primary | ICD-10-CM

## 2023-12-28 DIAGNOSIS — R73.9 HYPERGLYCEMIA: ICD-10-CM

## 2023-12-28 LAB
ANION GAP SERPL CALCULATED.3IONS-SCNC: 6 MMOL/L
BUN SERPL-MCNC: 10 MG/DL (ref 5–25)
CALCIUM SERPL-MCNC: 10.2 MG/DL (ref 8.4–10.2)
CHLORIDE SERPL-SCNC: 98 MMOL/L (ref 96–108)
CO2 SERPL-SCNC: 28 MMOL/L (ref 21–32)
CREAT SERPL-MCNC: 0.75 MG/DL (ref 0.6–1.3)
EST. AVERAGE GLUCOSE BLD GHB EST-MCNC: 111 MG/DL
GFR SERPL CREATININE-BSD FRML MDRD: 109 ML/MIN/1.73SQ M
GLUCOSE P FAST SERPL-MCNC: 95 MG/DL (ref 65–99)
HBA1C MFR BLD: 5.5 %
POTASSIUM SERPL-SCNC: 5 MMOL/L (ref 3.5–5.3)
SODIUM SERPL-SCNC: 132 MMOL/L (ref 135–147)

## 2023-12-28 PROCEDURE — 80048 BASIC METABOLIC PNL TOTAL CA: CPT

## 2023-12-28 PROCEDURE — 36415 COLL VENOUS BLD VENIPUNCTURE: CPT

## 2023-12-28 PROCEDURE — 83036 HEMOGLOBIN GLYCOSYLATED A1C: CPT

## 2023-12-28 NOTE — TELEPHONE ENCOUNTER
Patient scheduled an appointment for next Friday. But he wanted to know what his Hemoglobin was not the A1c Please call him at [phone]

## 2023-12-29 ENCOUNTER — TRANSCRIBE ORDERS (OUTPATIENT)
Dept: GASTROENTEROLOGY | Facility: CLINIC | Age: 48
End: 2023-12-29

## 2024-01-02 NOTE — TELEPHONE ENCOUNTER
Patient completed the blood work last week that was ordered for earlier this year.  I am placing full blood work in the computer have him complete

## 2024-01-04 ENCOUNTER — TELEPHONE (OUTPATIENT)
Dept: GASTROENTEROLOGY | Facility: CLINIC | Age: 49
End: 2024-01-04

## 2024-01-04 ENCOUNTER — CONSULT (OUTPATIENT)
Dept: GASTROENTEROLOGY | Facility: CLINIC | Age: 49
End: 2024-01-04
Payer: OTHER GOVERNMENT

## 2024-01-04 ENCOUNTER — APPOINTMENT (OUTPATIENT)
Dept: LAB | Facility: CLINIC | Age: 49
End: 2024-01-04
Payer: COMMERCIAL

## 2024-01-04 VITALS
TEMPERATURE: 95 F | DIASTOLIC BLOOD PRESSURE: 79 MMHG | OXYGEN SATURATION: 98 % | HEART RATE: 58 BPM | SYSTOLIC BLOOD PRESSURE: 127 MMHG | BODY MASS INDEX: 26.2 KG/M2 | HEIGHT: 70 IN | WEIGHT: 183 LBS

## 2024-01-04 DIAGNOSIS — R19.5 POSITIVE FIT (FECAL IMMUNOCHEMICAL TEST): Primary | ICD-10-CM

## 2024-01-04 DIAGNOSIS — E78.00 PURE HYPERCHOLESTEROLEMIA: ICD-10-CM

## 2024-01-04 DIAGNOSIS — D50.9 IRON DEFICIENCY ANEMIA, UNSPECIFIED IRON DEFICIENCY ANEMIA TYPE: ICD-10-CM

## 2024-01-04 LAB
ALBUMIN SERPL BCP-MCNC: 4.7 G/DL (ref 3.5–5)
ALP SERPL-CCNC: 46 U/L (ref 34–104)
ALT SERPL W P-5'-P-CCNC: 19 U/L (ref 7–52)
ANION GAP SERPL CALCULATED.3IONS-SCNC: 8 MMOL/L
AST SERPL W P-5'-P-CCNC: 22 U/L (ref 13–39)
BILIRUB SERPL-MCNC: 1.15 MG/DL (ref 0.2–1)
BUN SERPL-MCNC: 9 MG/DL (ref 5–25)
CALCIUM SERPL-MCNC: 10.7 MG/DL (ref 8.4–10.2)
CHLORIDE SERPL-SCNC: 96 MMOL/L (ref 96–108)
CHOLEST SERPL-MCNC: 205 MG/DL
CO2 SERPL-SCNC: 28 MMOL/L (ref 21–32)
CREAT SERPL-MCNC: 0.65 MG/DL (ref 0.6–1.3)
ERYTHROCYTE [DISTWIDTH] IN BLOOD BY AUTOMATED COUNT: 13.4 % (ref 11.6–15.1)
FERRITIN SERPL-MCNC: 276 NG/ML (ref 24–336)
GFR SERPL CREATININE-BSD FRML MDRD: 115 ML/MIN/1.73SQ M
GLUCOSE P FAST SERPL-MCNC: 94 MG/DL (ref 65–99)
HCT VFR BLD AUTO: 45.6 % (ref 36.5–49.3)
HDLC SERPL-MCNC: 80 MG/DL
HGB BLD-MCNC: 15.5 G/DL (ref 12–17)
IRON SERPL-MCNC: 162 UG/DL (ref 50–212)
LDLC SERPL CALC-MCNC: 111 MG/DL (ref 0–100)
MCH RBC QN AUTO: 30.5 PG (ref 26.8–34.3)
MCHC RBC AUTO-ENTMCNC: 34 G/DL (ref 31.4–37.4)
MCV RBC AUTO: 90 FL (ref 82–98)
PLATELET # BLD AUTO: 266 THOUSANDS/UL (ref 149–390)
PMV BLD AUTO: 10.4 FL (ref 8.9–12.7)
POTASSIUM SERPL-SCNC: 5.2 MMOL/L (ref 3.5–5.3)
PROT SERPL-MCNC: 7.5 G/DL (ref 6.4–8.4)
RBC # BLD AUTO: 5.08 MILLION/UL (ref 3.88–5.62)
SODIUM SERPL-SCNC: 132 MMOL/L (ref 135–147)
TRIGL SERPL-MCNC: 70 MG/DL
TSH SERPL DL<=0.05 MIU/L-ACNC: 1.19 UIU/ML (ref 0.45–4.5)
WBC # BLD AUTO: 4.79 THOUSAND/UL (ref 4.31–10.16)

## 2024-01-04 PROCEDURE — 83540 ASSAY OF IRON: CPT

## 2024-01-04 PROCEDURE — 84443 ASSAY THYROID STIM HORMONE: CPT | Performed by: FAMILY MEDICINE

## 2024-01-04 PROCEDURE — 82728 ASSAY OF FERRITIN: CPT

## 2024-01-04 PROCEDURE — 80061 LIPID PANEL: CPT | Performed by: FAMILY MEDICINE

## 2024-01-04 PROCEDURE — 36415 COLL VENOUS BLD VENIPUNCTURE: CPT | Performed by: FAMILY MEDICINE

## 2024-01-04 PROCEDURE — 85027 COMPLETE CBC AUTOMATED: CPT | Performed by: FAMILY MEDICINE

## 2024-01-04 PROCEDURE — 99203 OFFICE O/P NEW LOW 30 MIN: CPT | Performed by: INTERNAL MEDICINE

## 2024-01-04 PROCEDURE — 80053 COMPREHEN METABOLIC PANEL: CPT | Performed by: FAMILY MEDICINE

## 2024-01-04 RX ORDER — CHLORHEXIDINE GLUCONATE ORAL RINSE 1.2 MG/ML
SOLUTION DENTAL
COMMUNITY
Start: 2023-11-14 | End: 2024-01-05

## 2024-01-04 RX ORDER — AMOXICILLIN 500 MG/1
CAPSULE ORAL
COMMUNITY
Start: 2023-11-14 | End: 2024-01-05

## 2024-01-04 NOTE — TELEPHONE ENCOUNTER
ASC Screening    ASC Screening  BMI > than 45: No  Are you currently pregnant?: No  Do you rely on a wheelchair for mobility?: No  Do you need oxygen during the day?: No  Have you ever been informed by anesthesia that you have a difficult airway?: No  Have you been diagnosed with End Stage Renal Disease (ESRD)?: No  Are you actively on dialysis?: No  Have you been diagnosed with Pulmonary Hypertension?: No  Do you have a pacemaker or an Automatic Implantable Cardioverter Defibrillator (AICD)?: No  Have you ever had an organ transplant?: No  Have you had a stroke, heart attack, myocardial infarction (MI) within the last 6 months?: No  Have you ever been diagnosed with Aortic Stenosis?: No  Have you ever been diagnosed  with Congestive Heart Failure?: No  Have you ever been diagnosed with a heart valve disease?: No  Are you Diabetic?: No  If you are Diabetic, has your A1C been greater than 12 within the last six months?: N/A         Scheduled date of colon (as of today) Jan 17  Physician performing: Dr. Macdonald  Location of procedure:  [place]  Instructions given to patient: octavia  Clearances: n/a

## 2024-01-05 ENCOUNTER — OFFICE VISIT (OUTPATIENT)
Dept: FAMILY MEDICINE CLINIC | Facility: CLINIC | Age: 49
End: 2024-01-05
Payer: COMMERCIAL

## 2024-01-05 VITALS
HEIGHT: 70 IN | HEART RATE: 72 BPM | SYSTOLIC BLOOD PRESSURE: 100 MMHG | DIASTOLIC BLOOD PRESSURE: 70 MMHG | BODY MASS INDEX: 26.63 KG/M2 | WEIGHT: 186 LBS

## 2024-01-05 DIAGNOSIS — D50.9 IRON DEFICIENCY ANEMIA, UNSPECIFIED IRON DEFICIENCY ANEMIA TYPE: ICD-10-CM

## 2024-01-05 DIAGNOSIS — R73.9 HYPERGLYCEMIA: ICD-10-CM

## 2024-01-05 DIAGNOSIS — F41.1 GAD (GENERALIZED ANXIETY DISORDER): ICD-10-CM

## 2024-01-05 DIAGNOSIS — J30.1 NON-SEASONAL ALLERGIC RHINITIS DUE TO POLLEN: ICD-10-CM

## 2024-01-05 DIAGNOSIS — E87.1 HYPONATREMIA: ICD-10-CM

## 2024-01-05 DIAGNOSIS — E66.3 OVERWEIGHT: ICD-10-CM

## 2024-01-05 DIAGNOSIS — F10.10 ALCOHOL ABUSE: ICD-10-CM

## 2024-01-05 DIAGNOSIS — E83.52 HYPERCALCEMIA: ICD-10-CM

## 2024-01-05 DIAGNOSIS — F41.9 ANXIETY: ICD-10-CM

## 2024-01-05 DIAGNOSIS — E78.00 PURE HYPERCHOLESTEROLEMIA: ICD-10-CM

## 2024-01-05 DIAGNOSIS — F33.41 RECURRENT MAJOR DEPRESSIVE DISORDER, IN PARTIAL REMISSION (HCC): Primary | ICD-10-CM

## 2024-01-05 DIAGNOSIS — Z13.31 POSITIVE DEPRESSION SCREENING: ICD-10-CM

## 2024-01-05 PROBLEM — M54.2 NECK PAIN: Status: RESOLVED | Noted: 2020-06-30 | Resolved: 2024-01-05

## 2024-01-05 PROBLEM — Z86.19 HISTORY OF HELICOBACTER PYLORI INFECTION: Status: RESOLVED | Noted: 2018-12-21 | Resolved: 2024-01-05

## 2024-01-05 PROCEDURE — 99214 OFFICE O/P EST MOD 30 MIN: CPT | Performed by: FAMILY MEDICINE

## 2024-01-05 NOTE — ASSESSMENT & PLAN NOTE
[facility]  Positive depression screen patient will follow-[facility]

## 2024-01-05 NOTE — PROGRESS NOTES
Name: Radhames Rashid      : 1975      MRN: [id number]  Encounter Provider: Kory Candelaria DO  Encounter Date: 2024   Encounter department: [facility] PRIMARY C[facility]    Assessment & Plan     1.  MDD/JOSE MARTIN/anxiety/positive pression screen  [facility]  2.  Alcohol abuse, follows [facility].  Patient states he is significantly cut down his alcohol intake  3.  Hyperglycemia diet controlled  4.  Iron deficiency anemia iron and CBC normal.  Patient following with GI for endoscopy  5.  Hypercholesterolemia low-fat low-cholesterol diet is recommended  6.  BMI 26.69, patient muscular BMI is inaccurate  7.  Hyponatremia repeat with vasopressin level #8.  Hypercalcemia repeat with PTH and calcitonin  9.  Allergic rhinitis, stable on Flonase No. 10.  Blood work was ordered but he must draw this at the lab because of the vasopressin level  11.  Return in 1 year or sooner if needed        1. Recurrent major depressive disorder, in partial remission (HCC)  Assessment & Plan:  [facility].  Positive depression screen patient will follow-[facility]      2. Positive depression screening    3. Anxiety  Assessment & Plan:  [facility]      4. JOSE MARTIN (generalized anxiety disorder)  Assessment & Plan:  [facility]      5. Alcohol abuse  Assessment & Plan:  Patient states he has significantly decreased his alcohol intake      6. Hyperglycemia  Assessment & Plan:  Diet controlled    Orders:  -     Basic metabolic panel; Future; Expected date: 2024  -     Calcitonin; Future; Expected date: 2024  -     Calcium; Future; Expected date: 2024  -     PTH, intact; Future; Expected date: 2024  -     Arginine vasopressin hormone; Future; Expected date: 2024    7. Iron deficiency anemia, unspecified iron deficiency anemia type  Assessment &  Plan:  CBC and iron are normal patient follow with gastroenterology for colonoscopy      8. Pure hypercholesterolemia    9. Overweight    10. Hyponatremia  Assessment & Plan:  Repeat BMP with vasopressin level    Orders:  -     Basic metabolic panel; Future; Expected date: 01/05/2024  -     Calcitonin; Future; Expected date: 01/05/2024  -     Calcium; Future; Expected date: 01/05/2024  -     PTH, intact; Future; Expected date: 01/05/2024  -     Arginine vasopressin hormone; Future; Expected date: 01/05/2024    11. Hypercalcemia  -     Basic metabolic panel; Future; Expected date: 01/05/2024  -     Calcitonin; Future; Expected date: 01/05/2024  -     Calcium; Future; Expected date: 01/05/2024  -     PTH, intact; Future; Expected date: 01/05/2024  -     Arginine vasopressin hormone; Future; Expected date: 01/05/2024    12. Non-seasonal allergic rhinitis due to pollen  Assessment & Plan:  Stable on Flonase        Depression Screening and Follow-up Plan: Patient's depression screening was positive with a PHQ-9 score of 6. Patient advised to follow-up with PCP for further management.     Tobacco Cessation Counseling: The patient is sincerely urged to quit consumption of tobacco. He is ready to quit tobacco.         Subjective      Patient is here for blood work review.  Patient stated he decrease his alcohol intake.  [facility]      Review of Systems   Constitutional: Negative.    HENT: Negative.     Eyes: Negative.    Respiratory: Negative.     Cardiovascular: Negative.    Gastrointestinal: Negative.    Endocrine: Negative.    Genitourinary: Negative.    Musculoskeletal: Negative.    Skin: Negative.    Allergic/Immunologic: Negative.    Neurological: Negative.    Hematological: Negative.    Psychiatric/Behavioral:          HPI       Current Outpatient Medications on File Prior to Visit   Medication Sig   • Cholecalciferol (Vitamin D-3) 25 MCG (1000 UT) CAPS Take by mouth   •  "fluticasone (FLONASE) 50 mcg/act nasal spray 1 spray into each nostril daily   • Garlic 10 MG CAPS Take by mouth   • Omega-3 Fatty Acids (FISH OIL OMEGA-3 PO) Take by mouth in the morning   • sertraline (ZOLOFT) 100 mg tablet TAKE ONE TABLET BY MOUTH EVERY DAY FOR MOOD   • [DISCONTINUED] amoxicillin (AMOXIL) 500 mg capsule TAKE 1 CAPSULE BY MOUTH EVERY 8 HOURS UNTIL GONE (Patient not taking: Reported on 2024)   • [DISCONTINUED] chlorhexidine (PERIDEX) 0.12 % solution PLEASE SEE ATTACHED FOR DETAILED DIRECTIONS (Patient not taking: Reported on 2024)   • [DISCONTINUED] Multiple Vitamins-Minerals (IMMUNE SUPPORT PO) Take by mouth (Patient not taking: Reported on 2024)       Objective     /70   Pulse 72   Ht 5' 10\" (1.778 m)   Wt 84.4 kg (186 lb)   BMI 26.69 kg/m²   PHQ-2/9 Depression Screening    Little interest or pleasure in doing things: 0 - not at all  Feeling down, depressed, or hopeless: 1 - several days  Trouble falling or staying asleep, or sleeping too much: 1 - several days  Feeling tired or having little energy: 1 - several days  Poor appetite or overeatin - several days  Feeling bad about yourself - or that you are a failure or have let yourself or your family down: 2 - more than half the days  Trouble concentrating on things, such as reading the newspaper or watching television: 0 - not at all  Moving or speaking so slowly that other people could have noticed. Or the opposite - being so fidgety or restless that you have been moving around a lot more than usual: 0 - not at all  Thoughts that you would be better off dead, or of hurting yourself in some way: 0 - not at all  PHQ-9 Score: 6  PHQ-9 Interpretation: Mild depression        Physical Exam  Vitals and nursing note reviewed.   Constitutional:       Appearance: Normal appearance.   HENT:      Head: Normocephalic.      Mouth/Throat:      Mouth: Mucous membranes are moist.   Eyes:      General: No scleral icterus.  Neck:      " Vascular: No carotid bruit.   Cardiovascular:      Rate and Rhythm: Normal rate and regular rhythm.      Heart sounds: Normal heart sounds.   Pulmonary:      Effort: Pulmonary effort is normal.      Breath sounds: Normal breath sounds.   Abdominal:      General: Bowel sounds are normal.      Palpations: Abdomen is soft.      Tenderness: There is no abdominal tenderness.   Musculoskeletal:      Cervical back: Neck supple.      Right lower leg: No edema.      Left lower leg: No edema.   Skin:     General: Skin is warm and dry.   Neurological:      General: No focal deficit present.      Mental Status: He is alert.   Psychiatric:         Mood and Affect: Mood normal.       Kory Candelaria DO

## 2024-01-06 NOTE — PROGRESS NOTES
[facility] - Outpatient Consultation  Radhames Rashid 48 y.o. male MRN: [id number]  Encounter: [id number]    HPI:    Radhames Rashid is a 48 y.o. male who [facility] after he had a positive FIT.  Consult requested by Dr. Vaughn.    He had a negative FIT in 2022.  However, repeat FIT in 12/2023 came back positive.  He has no overt bleeding.  No weight loss.  No FH of colon cancer.  He had colonoscopy years ago due to rectal bleeding. Not on blood thinners.      REVIEW OF SYSTEMS:  CONSTITUTIONAL: Denies any fever, chills, rigors, and weight loss.  HEENT: No earache or tinnitus. Denies hearing loss or visual disturbances.  CARDIOVASCULAR: No chest pain or palpitations.   RESPIRATORY: Denies any cough, hemoptysis, shortness of breath or dyspnea on exertion.  GASTROINTESTINAL: As noted in the History of Present Illness.   GENITOURINARY: No problems with urination. Denies any hematuria or dysuria.  NEUROLOGIC: No dizziness or vertigo, denies headaches.   MUSCULOSKELETAL: Denies any muscle or joint pain.   SKIN: Denies skin rashes or itching.   ENDOCRINE: Denies excessive thirst. Denies intolerance to heat or cold.  PSYCHOSOCIAL: Denies depression or anxiety. Denies any recent memory loss.     Historical Information   Past Medical History:   Diagnosis Date    Anxiety     Depression      Past Surgical History:   Procedure Laterality Date    TYMPANOSTOMY TUBE PLACEMENT       Social History   Social History     Substance and Sexual Activity   Alcohol Use Yes    Alcohol/week: 14.0 standard drinks of alcohol    Types: 14 Cans of beer per week    Comment: once a week     Social History     Substance and Sexual Activity   Drug Use No     Social History     Tobacco Use   Smoking Status Some Days    Current packs/day: 0.00    Average packs/day: 1 pack/day for 25.6 years (25.6 ttl pk-yrs)    Types: Cigarettes    Start date: 1/1/1995    Last attempt to quit:  "7/25/2020    Years since quitting: 3.4   Smokeless Tobacco Former    Types: Snuff    Quit date: 7/1/1998   Tobacco Comments    Smoke when I drink alcohol. I go days even weeks without smoking.     Family History   Problem Relation Age of Onset    Cancer Maternal Grandfather     Cancer Maternal Grandmother         Melanoma       Meds/Allergies       Current Outpatient Medications:     Cholecalciferol (Vitamin D-3) 25 MCG (1000 UT) CAPS    fluticasone (FLONASE) 50 mcg/act nasal spray    Garlic 10 MG CAPS    sertraline (ZOLOFT) 100 mg tablet    Omega-3 Fatty Acids (FISH OIL OMEGA-3 PO)    Allergies   Allergen Reactions    Paxil [Paroxetine] Anxiety     And shakiness       Objective   Blood pressure 127/79, pulse 58, temperature (!) 95 °F (35 °C), temperature source Tympanic, height 5' 10\" (1.778 m), weight 83 kg (183 lb), SpO2 98%. Body mass index is 26.26 kg/m².  PHYSICAL EXAM:    General Appearance:   Alert, cooperative, no distress   HEENT:   Normocephalic, atraumatic, anicteric.     Neck:  Supple, symmetrical, trachea midline   Lungs:   Clear to auscultation bilaterally; no rales, rhonchi or wheezing; respirations unlabored    Heart::   Regular rate and rhythm; no murmur, rub, or gallop.   Abdomen:   Soft, non-tender, non-distended; normal bowel sounds; no masses, no organomegaly    Genitalia:   Deferred    Rectal:   Deferred    Extremities:  No cyanosis, clubbing or edema    Pulses:  2+ and symmetric    Skin:  No jaundice, rashes, or lesions    Lymph nodes:  No palpable cervical lymphadenopathy        Lab Results:   Appointment on 01/04/2024   Component Date Value    Ferritin 01/04/2024 276     Iron 01/04/2024 162        Lab Results   Component Value Date    WBC 4.79 01/04/2024    HGB 15.5 01/04/2024    HCT 45.6 01/04/2024    MCV 90 01/04/2024     01/04/2024       Lab Results   Component Value Date    SODIUM 132 (L) 01/04/2024    K 5.2 01/04/2024    CL 96 01/04/2024    CO2 28 01/04/2024    AGAP 8 01/04/2024 " "   BUN 9 01/04/2024    CREATININE 0.65 01/04/2024    GLUF 94 01/04/2024    CALCIUM 10.7 (H) 01/04/2024    AST 22 01/04/2024    ALT 19 01/04/2024    ALKPHOS 46 01/04/2024    TP 7.5 01/04/2024    TBILI 1.15 (H) 01/04/2024    EGFR 115 01/04/2024       No results found for: \"CRP\"    Lab Results   Component Value Date    VXZ5IVKSYJJB 1.191 01/04/2024       Lab Results   Component Value Date    IRON 162 01/04/2024    FERRITIN 276 01/04/2024       Radiology Results:   No results found.    ______________________________________________________________________  ASSESSMENT AND PLAN:    Radhames Rashid is a 48 y.o. male who presents with positive FIT.  No other concerning signs or symptoms.  FIT was negative last year.  We will schedule colonoscopy.  I discussed the risks of bleeding, infection, and perforation associated with endoscopic procedures.       1. Positive FIT (fecal immunochemical test)        Orders Placed This Encounter   Procedures    Colonoscopy     "

## 2024-01-08 ENCOUNTER — ANESTHESIA (OUTPATIENT)
Dept: ANESTHESIOLOGY | Facility: HOSPITAL | Age: 49
End: 2024-01-08

## 2024-01-08 ENCOUNTER — ANESTHESIA EVENT (OUTPATIENT)
Dept: ANESTHESIOLOGY | Facility: HOSPITAL | Age: 49
End: 2024-01-08

## 2024-01-15 ENCOUNTER — TELEPHONE (OUTPATIENT)
Age: 49
End: 2024-01-15

## 2024-01-15 NOTE — TELEPHONE ENCOUNTER
Patients GI provider:  Dr. Macdonald    Number to return call: [phone]    Reason for call: Pt calling to see if the doctor can order anxiety medication to calm him down once he checks in for his procedure. Pt had issues in the past with anesthesia and when the nurses were inserting the IV to the point where he almost passed out. Pt wants to make sure this process goes smooth and wants anxiety medication so he does not have any problems.     Scheduled procedure/appointment date if applicable: 1/17/2024 - Colonoscopy -

## 2024-01-16 DIAGNOSIS — R19.5 POSITIVE FIT (FECAL IMMUNOCHEMICAL TEST): Primary | ICD-10-CM

## 2024-01-16 DIAGNOSIS — F41.9 ANXIETY: ICD-10-CM

## 2024-01-16 RX ORDER — SODIUM CHLORIDE 9 MG/ML
125 INJECTION, SOLUTION INTRAVENOUS CONTINUOUS
OUTPATIENT
Start: 2024-01-16

## 2024-01-16 NOTE — TELEPHONE ENCOUNTER
Called pt and went over 2 options that dr england had requested. Pt became very upset yelling and saying he wants this to be figured out today because he doesn't want to do the prep and they it gets canceled due to anxiety and not being able to have something. He would like dr england to give him a call or bernice today. Pt was yelling and angry and the hung up phone.

## 2024-01-16 NOTE — TELEPHONE ENCOUNTER
Ok I will let him know. Thank you. Is there a chance that once he gets consent and speaks with bernice for approval, would you consider giving pt something then?

## 2024-01-29 ENCOUNTER — TELEPHONE (OUTPATIENT)
Dept: FAMILY MEDICINE CLINIC | Facility: CLINIC | Age: 49
End: 2024-01-29

## 2024-01-29 NOTE — TELEPHONE ENCOUNTER
Notify patient our lab had a problem which affected calcium level.  It need not affect the sodium level.  Patient should repeat blookwork as ordered

## 2024-01-29 NOTE — TELEPHONE ENCOUNTER
----- Message from Barbara Ibarra sent at 2024  3:55 PM EST -----  Regarding: LAB VALUE NOTIFICATION  2024     Dear Provider,      [facility] is notifying you that an analyzer at the core laboratory had an intermittent malfunction impacting serum Calcium results between 2023, and 2024. Due to the error, patients may have had a falsely elevated serum Calcium level reported. This error did not have an impact on other lab tests. Please review the below patient's result:     Patient Name: Radhames Rashid   : 1975   MRN: [id number]    Specimen Collection:  Calcium       Date                     Value               Ref Range           Status                2024               10.7 (H)            8.4 - 10.2 mg/#     Final            ----------       [facility] recommends repeat testing at no charge, especially for patients whose clinical presentation does not match the result. The need for reordering is at your discretion.      For repeat testing, please utilize the order: Calcium (LAB 53)     Refer questions to the Lab Customer Service Center at [phone], option 1.     Thank you,     Ashly Barfield, MT ASCP MHA  Director of Clinical Pathology-Operations  [facility]     Radhames Jackman MD, FCAP   of Pathology & Laboratory Medicine  [facility]

## 2024-05-25 ENCOUNTER — VBI (OUTPATIENT)
Dept: ADMINISTRATIVE | Facility: OTHER | Age: 49
End: 2024-05-25

## 2024-05-25 NOTE — TELEPHONE ENCOUNTER
Upon review of the In Basket request we were able to locate, review, and update the patient chart as requested for CRC: Colonoscopy.    Any additional questions or concerns should be emailed to the Practice Liaisons via the appropriate education email address, please do not reply via In Basket.    Thank you  Hang Gore MA

## 2024-05-28 DIAGNOSIS — K63.5 POLYP OF COLON, UNSPECIFIED PART OF COLON, UNSPECIFIED TYPE: Primary | ICD-10-CM

## 2024-09-09 ENCOUNTER — OFFICE VISIT (OUTPATIENT)
Dept: FAMILY MEDICINE CLINIC | Facility: CLINIC | Age: 49
End: 2024-09-09
Payer: COMMERCIAL

## 2024-09-09 VITALS
DIASTOLIC BLOOD PRESSURE: 86 MMHG | BODY MASS INDEX: 24.62 KG/M2 | WEIGHT: 172 LBS | HEART RATE: 57 BPM | SYSTOLIC BLOOD PRESSURE: 128 MMHG | OXYGEN SATURATION: 99 % | HEIGHT: 70 IN

## 2024-09-09 DIAGNOSIS — D50.9 IRON DEFICIENCY ANEMIA, UNSPECIFIED IRON DEFICIENCY ANEMIA TYPE: ICD-10-CM

## 2024-09-09 DIAGNOSIS — R73.9 HYPERGLYCEMIA: ICD-10-CM

## 2024-09-09 DIAGNOSIS — E87.1 HYPONATREMIA: ICD-10-CM

## 2024-09-09 DIAGNOSIS — F33.42 RECURRENT MAJOR DEPRESSIVE DISORDER, IN FULL REMISSION (HCC): ICD-10-CM

## 2024-09-09 DIAGNOSIS — Z00.00 HEALTHCARE MAINTENANCE: Primary | ICD-10-CM

## 2024-09-09 DIAGNOSIS — F10.10 ALCOHOL ABUSE: ICD-10-CM

## 2024-09-09 DIAGNOSIS — F41.1 GAD (GENERALIZED ANXIETY DISORDER): ICD-10-CM

## 2024-09-09 DIAGNOSIS — E83.52 HYPERCALCEMIA: ICD-10-CM

## 2024-09-09 DIAGNOSIS — E66.3 OVERWEIGHT: ICD-10-CM

## 2024-09-09 DIAGNOSIS — E78.00 PURE HYPERCHOLESTEROLEMIA: ICD-10-CM

## 2024-09-09 DIAGNOSIS — K63.5 POLYP OF COLON, UNSPECIFIED PART OF COLON, UNSPECIFIED TYPE: ICD-10-CM

## 2024-09-09 DIAGNOSIS — Z72.0 TOBACCO ABUSE: ICD-10-CM

## 2024-09-09 PROBLEM — Z12.11 SCREENING FOR COLON CANCER: Status: RESOLVED | Noted: 2023-01-09 | Resolved: 2024-09-09

## 2024-09-09 PROCEDURE — 99396 PREV VISIT EST AGE 40-64: CPT | Performed by: FAMILY MEDICINE

## 2024-09-09 NOTE — PROGRESS NOTES
Adult Annual Physical  Name: Radhames Rashid      : 1975      MRN: [id number]  Encounter Provider: Kory Candelaria DO  Encounter Date: 2024   Encounter department: [facility] PRIMARY [facility]ARE    1.  Healthcare maintenance annual exam completed, immunizations up-to-date  2.  Natremia/hypercalcemia, blood work reprinted will complete the next few days  3.  Colon polyp update colonoscopy  4.  JOSE MARTIN/MDD, in remission [facility] currently [facility] Zoloft  5.  Tobacco abuse, complete cessation recommended  6.  Hyperglycemia blood work ordered #7.  Hypercholesterolemia blood work ordered  8.  Iron deficiency anemia, CBC and iron levels are normal without supplementation, resolved  9.  Alcohol abuse, resolved patient's drinking less than a sixpack a week with no hard liquor  10  Return in 6 months for office visit and blood work sooner if needed      Assessment & Plan   1. Healthcare maintenance  Assessment & Plan:  Patient seen examined chart reviewed up-to-date with immunizations  2. Hyperglycemia  Assessment & Plan:  Blood work ordered  Orders:  -     CBC; Future; Expected date: 2025  -     Comprehensive metabolic panel; Future; Expected date: 2025  -     Hemoglobin A1C; Future; Expected date: 2025  -     Lipid Panel with Direct LDL reflex; Future; Expected date: 2025  -     TSH, 3rd generation with Free T4 reflex; Future; Expected date: 2025  -     UA (URINE) with reflex to Scope; Future; Expected date: 2025  3. Hyponatremia  Assessment & Plan:  Reprinted blood work for sodium and ADH  Orders:  -     CBC; Future; Expected date: 2025  -     Comprehensive metabolic panel; Future; Expected date: 2025  -     Hemoglobin A1C; Future; Expected date: 2025  -     Lipid Panel with Direct LDL reflex; Future; Expected date: 2025  -     TSH, 3rd generation with Free T4 reflex; Future; Expected date: 2025  -     UA (URINE) with  reflex to Scope; Future; Expected date: 03/09/2025  4. Pure hypercholesterolemia  Assessment & Plan:  Blood work is ordered  Orders:  -     CBC; Future; Expected date: 03/09/2025  -     Comprehensive metabolic panel; Future; Expected date: 03/09/2025  -     Hemoglobin A1C; Future; Expected date: 03/09/2025  -     Lipid Panel with Direct LDL reflex; Future; Expected date: 03/09/2025  -     TSH, 3rd generation with Free T4 reflex; Future; Expected date: 03/09/2025  -     UA (URINE) with reflex to Scope; Future; Expected date: 03/09/2025  5. Recurrent major depressive disorder, in full remission (HCC)  Assessment & Plan:  In remission on Zoloft [facility]  Orders:  -     CBC; Future; Expected date: 03/09/2025  -     Comprehensive metabolic panel; Future; Expected date: 03/09/2025  -     Hemoglobin A1C; Future; Expected date: 03/09/2025  -     Lipid Panel with Direct LDL reflex; Future; Expected date: 03/09/2025  -     TSH, 3rd generation with Free T4 reflex; Future; Expected date: 03/09/2025  -     UA (URINE) with reflex to Scope; Future; Expected date: 03/09/2025  6. JOSE MARTIN (generalized anxiety disorder)  Assessment & Plan:  Stable on Zoloft [facility]  Orders:  -     CBC; Future; Expected date: 03/09/2025  -     Comprehensive metabolic panel; Future; Expected date: 03/09/2025  -     Hemoglobin A1C; Future; Expected date: 03/09/2025  -     Lipid Panel with Direct LDL reflex; Future; Expected date: 03/09/2025  -     TSH, 3rd generation with Free T4 reflex; Future; Expected date: 03/09/2025  -     UA (URINE) with reflex to Scope; Future; Expected date: 03/09/2025  7. Alcohol abuse  Assessment & Plan:  Less than 1 sixpack weekly patient no longer drinks hard liquor  Orders:  -     CBC; Future; Expected date: 03/09/2025  -     Comprehensive metabolic panel; Future; Expected date: 03/09/2025  -     Hemoglobin A1C; Future; Expected date: 03/09/2025  -     Lipid Panel with Direct LDL  reflex; Future; Expected date: 03/09/2025  -     TSH, 3rd generation with Free T4 reflex; Future; Expected date: 03/09/2025  -     UA (URINE) with reflex to Scope; Future; Expected date: 03/09/2025  8. Hypercalcemia  -     CBC; Future; Expected date: 03/09/2025  -     Comprehensive metabolic panel; Future; Expected date: 03/09/2025  -     Hemoglobin A1C; Future; Expected date: 03/09/2025  -     Lipid Panel with Direct LDL reflex; Future; Expected date: 03/09/2025  -     TSH, 3rd generation with Free T4 reflex; Future; Expected date: 03/09/2025  -     UA (URINE) with reflex to Scope; Future; Expected date: 03/09/2025  9. Tobacco abuse  Assessment & Plan:  Complete cessation recommended  Orders:  -     CBC; Future; Expected date: 03/09/2025  -     Comprehensive metabolic panel; Future; Expected date: 03/09/2025  -     Hemoglobin A1C; Future; Expected date: 03/09/2025  -     Lipid Panel with Direct LDL reflex; Future; Expected date: 03/09/2025  -     TSH, 3rd generation with Free T4 reflex; Future; Expected date: 03/09/2025  -     UA (URINE) with reflex to Scope; Future; Expected date: 03/09/2025  10. Polyp of colon, unspecified part of colon, unspecified type  Assessment & Plan:  Up-to-date with colonoscopy  Orders:  -     CBC; Future; Expected date: 03/09/2025  -     Comprehensive metabolic panel; Future; Expected date: 03/09/2025  -     Hemoglobin A1C; Future; Expected date: 03/09/2025  -     Lipid Panel with Direct LDL reflex; Future; Expected date: 03/09/2025  -     TSH, 3rd generation with Free T4 reflex; Future; Expected date: 03/09/2025  -     UA (URINE) with reflex to Scope; Future; Expected date: 03/09/2025  11. Iron deficiency anemia, unspecified iron deficiency anemia type  Assessment & Plan:  CBC and iron studies all normal, resolved  Orders:  -     CBC; Future; Expected date: 03/09/2025  -     Comprehensive metabolic panel; Future; Expected date: 03/09/2025  -     Hemoglobin A1C; Future; Expected date:  03/09/2025  -     Lipid Panel with Direct LDL reflex; Future; Expected date: 03/09/2025  -     TSH, 3rd generation with Free T4 reflex; Future; Expected date: 03/09/2025  -     UA (URINE) with reflex to Scope; Future; Expected date: 03/09/2025  12. Overweight  Assessment & Plan:  Patient lost 6 pounds currently BMI is normal  Orders:  -     CBC; Future; Expected date: 03/09/2025  -     Comprehensive metabolic panel; Future; Expected date: 03/09/2025  -     Hemoglobin A1C; Future; Expected date: 03/09/2025  -     Lipid Panel with Direct LDL reflex; Future; Expected date: 03/09/2025  -     TSH, 3rd generation with Free T4 reflex; Future; Expected date: 03/09/2025  -     UA (URINE) with reflex to Scope; Future; Expected date: 03/09/2025    Immunizations and preventive care screenings were discussed with patient today. Appropriate education was printed on patient's after visit summary.        Counseling:  Alcohol/drug use: discussed moderation in alcohol intake, the recommendations for healthy alcohol use, and avoidance of illicit drug use.      Depression Screening and Follow-up Plan: Patient was screened for depression during today's encounter. They screened negative with a PHQ-9 score of 2.        History of Present Illness     Adult Annual Physical:  Patient presents for annual physical.     Diet and Physical Activity:  - Diet/Nutrition: well balanced diet.  - Exercise: no formal exercise.    Depression Screening:    - PHQ-9 Score: 2    General Health:  - Sleep: sleeps poorly.  - Hearing: normal hearing right ear.  - Vision: no vision problems.  - Dental: regular dental visits.     Health:  - History of STDs: no.   - Urinary symptoms: none.     Advanced Care Planning:  - Has an advanced directive?: yes    - Has a durable medical POA?: yes    - ACP document given to patient?: no      Review of Systems   Constitutional: Negative.    HENT: Negative.     Eyes: Negative.    Respiratory: Negative.     Cardiovascular:  "Negative.    Gastrointestinal: Negative.    Endocrine: Negative.    Genitourinary: Negative.    Musculoskeletal: Negative.    Skin: Negative.    Allergic/Immunologic: Negative.    Neurological: Negative.    Hematological: Negative.    Psychiatric/Behavioral: Negative.           Objective     /86 (BP Location: Right arm, Patient Position: Sitting, Cuff Size: Standard)   Pulse 57   Ht 5' 10\" (1.778 m)   Wt 78 kg (172 lb)   SpO2 99%   BMI 24.68 kg/m²     Physical Exam  Nursing note reviewed.   HENT:      Head: Normocephalic and atraumatic.      Right Ear: Tympanic membrane normal.      Left Ear: Tympanic membrane normal.      Nose: Nose normal.      Mouth/Throat:      Mouth: Mucous membranes are moist.      Pharynx: Oropharynx is clear. No oropharyngeal exudate or posterior oropharyngeal erythema.   Eyes:      General: No scleral icterus.  Neck:      Vascular: No carotid bruit.   Cardiovascular:      Rate and Rhythm: Normal rate and regular rhythm.      Heart sounds: Normal heart sounds.   Pulmonary:      Effort: Pulmonary effort is normal.      Breath sounds: Normal breath sounds.   Abdominal:      General: Bowel sounds are normal.      Palpations: Abdomen is soft.      Tenderness: There is no abdominal tenderness.   Musculoskeletal:      Cervical back: Neck supple.      Right lower leg: No edema.      Left lower leg: No edema.   Lymphadenopathy:      Cervical: No cervical adenopathy.   Skin:     General: Skin is warm and dry.   Neurological:      General: No focal deficit present.      Mental Status: He is alert.   Psychiatric:         Mood and Affect: Mood normal.         "

## 2024-09-09 NOTE — PATIENT INSTRUCTIONS
Complete blood work for sodium and calcium, this needs to be completed at any of the area hospitals  [facility]  Return in 6 months for office visit and blood work sooner if needed

## 2024-10-09 PROBLEM — Z00.00 HEALTHCARE MAINTENANCE: Status: RESOLVED | Noted: 2023-01-09 | Resolved: 2024-10-09

## 2024-12-11 ENCOUNTER — TELEPHONE (OUTPATIENT)
Age: 49
End: 2024-12-11

## 2024-12-11 DIAGNOSIS — E83.52 HYPERCALCEMIA: ICD-10-CM

## 2024-12-11 DIAGNOSIS — E87.1 HYPONATREMIA: Primary | ICD-10-CM

## 2024-12-11 NOTE — TELEPHONE ENCOUNTER
Patient called states that the order for the following tests expires on 1/4/25:    Arginine vasopressin hormone   PTH, intact   Calcium   Calcitonin   BMP    Patient is asking if orders can be extended because he does not think that he will be able to get these drawn before 1/4 due to his work schedule.    Patient would like a call back to advise.

## 2024-12-12 NOTE — TELEPHONE ENCOUNTER
Pt called in to confirm if his lab orders have been extended as per requested. Did check on the chart and confirmed the same with the patient. Thanks

## 2025-02-19 ENCOUNTER — OFFICE VISIT (OUTPATIENT)
Dept: FAMILY MEDICINE CLINIC | Facility: CLINIC | Age: 50
End: 2025-02-19
Payer: COMMERCIAL

## 2025-02-19 VITALS
DIASTOLIC BLOOD PRESSURE: 78 MMHG | OXYGEN SATURATION: 97 % | HEART RATE: 64 BPM | SYSTOLIC BLOOD PRESSURE: 136 MMHG | BODY MASS INDEX: 25.2 KG/M2 | WEIGHT: 176 LBS | HEIGHT: 70 IN

## 2025-02-19 DIAGNOSIS — Z72.0 TOBACCO ABUSE: ICD-10-CM

## 2025-02-19 DIAGNOSIS — F33.42 RECURRENT MAJOR DEPRESSIVE DISORDER, IN FULL REMISSION (HCC): ICD-10-CM

## 2025-02-19 DIAGNOSIS — G89.29 CHRONIC PAIN OF RIGHT KNEE: Primary | ICD-10-CM

## 2025-02-19 DIAGNOSIS — M15.0 PRIMARY OSTEOARTHRITIS INVOLVING MULTIPLE JOINTS: ICD-10-CM

## 2025-02-19 DIAGNOSIS — M25.561 CHRONIC PAIN OF RIGHT KNEE: Primary | ICD-10-CM

## 2025-02-19 PROBLEM — M19.90 OSTEOARTHRITIS: Status: ACTIVE | Noted: 2025-02-19

## 2025-02-19 PROCEDURE — 99214 OFFICE O/P EST MOD 30 MIN: CPT | Performed by: FAMILY MEDICINE

## 2025-02-19 RX ORDER — NAPROXEN 500 MG/1
500 TABLET ORAL 2 TIMES DAILY WITH MEALS
Qty: 30 TABLET | Refills: 1 | Status: SHIPPED | OUTPATIENT
Start: 2025-02-19

## 2025-02-19 NOTE — PROGRESS NOTES
Name: Radhames Rashid      : 1975      MRN: [id number]  Encounter Provider: Kory Candelaria DO  Encounter Date: 2025   Encounter department: [facility]  Patient return as scheduled next month  :  Assessment & Plan  Chronic pain of right knee  Naprosyn ordered GI side effects discussed discussed the minor drug interaction tween Zoloft patient may take both  X-ray ordered  Refer to physical therapy  Refer to Ortho  Orders:  •  naproxen (NAPROSYN) 500 mg tablet; Take 1 tablet (500 mg total) by mouth 2 (two) times a day with meals  •  XR knee 3 vw right non injury; Future  •  Ambulatory Referral to Physical Therapy; Future  •  Ambulatory Referral to Orthopedic Surgery; Future    Primary osteoarthritis involving multiple joints  Naprosyn as ordered, x-ray ordered  Orders:  •  naproxen (NAPROSYN) 500 mg tablet; Take 1 tablet (500 mg total) by mouth 2 (two) times a day with meals  •  XR knee 3 vw right non injury; Future  •  Ambulatory Referral to Physical Therapy; Future  •  Ambulatory Referral to Orthopedic Surgery; Future    Tobacco abuse  Complete cessation is recommended       Recurrent major depressive disorder, in full remission (HCC)  Depression Screening Follow-up Plan: Patient's depression screening was positive with a PHQ-9 score of 6. Patient declines further evaluation by mental health professional and/or medications. They have no active suicidal ideations. Brief counseling provided and recommend additional follow-up/re-evaluation at next office visit.  Patient currently on Zoloft 100 mg daily patient to follow-up with psychiatrist             Depression Screening and Follow-up Plan: Patient's depression screening was positive with a PHQ-9 score of 6.   Patient advised to follow-up with PCP for further management.       History of Present Illness   Patient had right knee pain for multiple months.  No history of trauma but at his job he does kneel on his knees most of  "the time.      Review of Systems   Constitutional: Negative.    HENT: Negative.     Eyes: Negative.    Respiratory: Negative.     Cardiovascular: Negative.    Gastrointestinal: Negative.    Endocrine: Negative.    Genitourinary: Negative.    Musculoskeletal:         HPI   Skin: Negative.    Allergic/Immunologic: Negative.    Neurological: Negative.    Hematological: Negative.    Psychiatric/Behavioral: Negative.         Objective   /78 (BP Location: Right arm, Patient Position: Sitting, Cuff Size: Large)   Pulse 64   Ht 5' 10\" (1.778 m)   Wt 79.8 kg (176 lb)   SpO2 97%   BMI 25.25 kg/m²      Physical Exam  Vitals and nursing note reviewed.   Constitutional:       Appearance: Normal appearance.   HENT:      Head: Normocephalic and atraumatic.      Mouth/Throat:      Mouth: Mucous membranes are moist.   Eyes:      General: No scleral icterus.  Neck:      Vascular: No carotid bruit.   Cardiovascular:      Rate and Rhythm: Normal rate and regular rhythm.      Heart sounds: Normal heart sounds.   Pulmonary:      Effort: Pulmonary effort is normal.      Breath sounds: Normal breath sounds.   Musculoskeletal:         General: Tenderness present. No deformity.      Cervical back: Neck supple.      Comments: Right knee full range of motion positive crepitus positive anterior tibial plateau tenderness   Skin:     Findings: No erythema.   Neurological:      General: No focal deficit present.      Mental Status: He is alert.   Psychiatric:         Thought Content: Thought content normal.         "

## 2025-02-19 NOTE — ASSESSMENT & PLAN NOTE
Naprosyn as ordered, x-ray ordered  Orders:  •  naproxen (NAPROSYN) 500 mg tablet; Take 1 tablet (500 mg total) by mouth 2 (two) times a day with meals  •  XR knee 3 vw right non injury; Future  •  Ambulatory Referral to Physical Therapy; Future  •  Ambulatory Referral to Orthopedic Surgery; Future

## 2025-02-19 NOTE — PATIENT INSTRUCTIONS
Complete x-ray right knee  Follow-up physical therapy  Follow with orthopedics  Take Naprosyn 500 mg twice daily as needed with food  Follow-up with your psychiatrist per their recommendations  I recommend complete tobacco cessation  Return at scheduled appointment next month sooner if needed

## 2025-02-19 NOTE — ASSESSMENT & PLAN NOTE
Depression Screening Follow-up Plan: Patient's depression screening was positive with a PHQ-9 score of 6. Patient declines further evaluation by mental health professional and/or medications. They have no active suicidal ideations. Brief counseling provided and recommend additional follow-up/re-evaluation at next office visit.  Patient currently on Zoloft 100 mg daily patient to follow-up with psychiatrist

## 2025-04-16 ENCOUNTER — HOSPITAL ENCOUNTER (OUTPATIENT)
Dept: MRI IMAGING | Facility: HOSPITAL | Age: 50
Discharge: HOME | End: 2025-04-16
Payer: COMMERCIAL

## 2025-04-16 DIAGNOSIS — S83.241A ACUTE MEDIAL MENISCUS TEAR OF RIGHT KNEE, INITIAL ENCOUNTER: ICD-10-CM

## 2025-04-16 PROCEDURE — 73721 MRI JNT OF LWR EXTRE W/O DYE: CPT

## 2025-06-18 ENCOUNTER — OFFICE VISIT (OUTPATIENT)
Dept: FAMILY MEDICINE CLINIC | Facility: CLINIC | Age: 50
End: 2025-06-18

## 2025-06-18 VITALS
BODY MASS INDEX: 25.91 KG/M2 | DIASTOLIC BLOOD PRESSURE: 80 MMHG | HEART RATE: 61 BPM | HEIGHT: 70 IN | RESPIRATION RATE: 18 BRPM | OXYGEN SATURATION: 96 % | TEMPERATURE: 96.1 F | SYSTOLIC BLOOD PRESSURE: 112 MMHG | WEIGHT: 181 LBS

## 2025-06-18 DIAGNOSIS — F33.42 RECURRENT MAJOR DEPRESSIVE DISORDER, IN FULL REMISSION (HCC): ICD-10-CM

## 2025-06-18 DIAGNOSIS — L23.7 POISON IVY DERMATITIS: Primary | ICD-10-CM

## 2025-06-18 PROCEDURE — 99214 OFFICE O/P EST MOD 30 MIN: CPT | Performed by: NURSE PRACTITIONER

## 2025-06-18 RX ORDER — LORAZEPAM 0.5 MG/1
0.5 TABLET ORAL DAILY PRN
COMMUNITY

## 2025-06-18 RX ORDER — TRIAMCINOLONE ACETONIDE 1 MG/G
CREAM TOPICAL 2 TIMES DAILY
Qty: 45 G | Refills: 0 | Status: SHIPPED | OUTPATIENT
Start: 2025-06-18 | End: 2025-06-19

## 2025-06-18 RX ORDER — METHYLPREDNISOLONE 4 MG/1
TABLET ORAL
Qty: 21 EACH | Refills: 0 | Status: SHIPPED | OUTPATIENT
Start: 2025-06-18

## 2025-06-18 NOTE — PROGRESS NOTES
Depression Screening Follow-up Plan: Patient's depression screening was positive with a PHQ-9 score of 9. Patient assessed for underlying major depression. They have no active suicidal ideations. Brief counseling provided and recommend additional follow-up/re-evaluation next office visit.Name: Radhames Rashid      : 1975      MRN: [id number]  Encounter Provider: ERIN Castillo  Encounter Date: 2025   Encounter department: [facility]  :  Assessment & Plan  Poison ivy dermatitis  Medrol Dosepak and Kenalog cream were ordered to be used as directed for treatment of poison ivy dermatitis.  Orders:  •  methylPREDNISolone 4 MG tablet therapy pack; Use as directed on package  •  triamcinolone (KENALOG) 0.1 % cream; Apply topically 2 (two) times a day    Recurrent major depressive disorder, in full remission (HCC)  Depression Screening Follow-up Plan: Patient's depression screening was positive with a PHQ-9 score of 9. Patient assessed for underlying major depression. They have no active suicidal ideations. Brief counseling provided and recommend additional follow-up/re-evaluation next office visit.  Patient continues to follow with psychiatry and reports that his symptoms are stable on current medication regimen.             Depression Screening and Follow-up Plan: Patient's depression screening was positive with a PHQ-9 score of 9.   Patient assessed for underlying major depression. Brief counseling provided and recommend additional follow-up/re-evaluation next office visit.     Tobacco Cessation Counseling: Tobacco cessation counseling was provided. The patient is sincerely urged to quit consumption of tobacco. He is not ready to quit tobacco.       History of Present Illness   Poison ivy dermatitis: Patient reports that he was doing some gardening in his yard 6 days ago and afterward he began to note a rash on his bilateral upper extremities and abdomen.  He does report  "that the rash is pruritic but he denies any drainage from the affected areas.  He has been using calamine lotion and cortisone cream on the affected areas with very limited improvement.    MDD: Patient does have a noted history of major depressive disorder.  Patient's PHQ score was positive in the office today for mild depression.  Patient does currently follow with a psychiatrist and is currently managed on Zoloft 100 mg daily.      Review of Systems   Constitutional:  Negative for chills and fever.   HENT:  Negative for ear pain and sore throat.    Eyes:  Negative for pain and visual disturbance.   Respiratory:  Negative for cough, chest tightness, shortness of breath and wheezing.    Cardiovascular:  Negative for chest pain, palpitations and leg swelling.   Gastrointestinal:  Negative for abdominal pain, constipation, diarrhea, nausea and vomiting.   Endocrine: Negative for cold intolerance and heat intolerance.   Genitourinary:  Negative for decreased urine volume, dysuria and hematuria.   Musculoskeletal:  Negative for arthralgias, back pain and myalgias.   Skin:  Positive for rash (BUE and abdomen). Negative for color change.   Allergic/Immunologic: Negative for environmental allergies.   Neurological:  Negative for dizziness, seizures, syncope, weakness, light-headedness, numbness and headaches.   Hematological:  Negative for adenopathy.   Psychiatric/Behavioral:  Negative for confusion. The patient is not nervous/anxious.    All other systems reviewed and are negative.      Objective   /80 (BP Location: Right arm, Patient Position: Sitting, Cuff Size: Adult)   Pulse 61   Temp (!) 96.1 °F (35.6 °C) (Tympanic)   Resp 18   Ht 5' 10\" (1.778 m)   Wt 82.1 kg (181 lb)   SpO2 96%   BMI 25.97 kg/m²      Physical Exam  Vitals and nursing note reviewed.   Constitutional:       General: He is not in acute distress.     Appearance: Normal appearance. He is well-developed. He is not ill-appearing.   HENT:     "  Head: Normocephalic.     Eyes:      Conjunctiva/sclera: Conjunctivae normal.       Cardiovascular:      Rate and Rhythm: Normal rate and regular rhythm.      Pulses: Normal pulses.           Carotid pulses are 2+ on the right side and 2+ on the left side.       Radial pulses are 2+ on the right side and 2+ on the left side.        Posterior tibial pulses are 2+ on the right side and 2+ on the left side.      Heart sounds: Normal heart sounds. No murmur heard.  Pulmonary:      Effort: Pulmonary effort is normal. No respiratory distress.      Breath sounds: Normal breath sounds. No decreased breath sounds, wheezing, rhonchi or rales.   Abdominal:      General: Abdomen is flat. There is no distension.      Palpations: Abdomen is soft.      Tenderness: There is no abdominal tenderness. There is no guarding.     Musculoskeletal:         General: No swelling. Normal range of motion.      Cervical back: Normal range of motion and neck supple.      Right lower leg: No edema.      Left lower leg: No edema.     Skin:     General: Skin is warm and dry.      Capillary Refill: Capillary refill takes less than 2 seconds.      Findings: Rash present. Rash is macular and papular.      Comments: Generalized, red, pruritic, maculopapular rash noted on the patient's bilateral upper extremities and abdomen.  Rash is consistent with poison ivy dermatitis.     Neurological:      General: No focal deficit present.      Mental Status: He is alert and oriented to person, place, and time.     Psychiatric:         Mood and Affect: Mood normal.         Behavior: Behavior normal.         Thought Content: Thought content normal.         Judgment: Judgment normal.

## 2025-06-18 NOTE — ASSESSMENT & PLAN NOTE
Medrol Dosepak and Kenalog cream were ordered to be used as directed for treatment of poison ivy dermatitis.  Orders:  •  methylPREDNISolone 4 MG tablet therapy pack; Use as directed on package  •  triamcinolone (KENALOG) 0.1 % cream; Apply topically 2 (two) times a day

## 2025-06-18 NOTE — ASSESSMENT & PLAN NOTE
Depression Screening Follow-up Plan: Patient's depression screening was positive with a PHQ-9 score of 9. Patient assessed for underlying major depression. They have no active suicidal ideations. Brief counseling provided and recommend additional follow-up/re-evaluation next office visit.  Patient continues to follow with psychiatry and reports that his symptoms are stable on current medication regimen.

## 2025-06-19 ENCOUNTER — TELEPHONE (OUTPATIENT)
Dept: FAMILY MEDICINE CLINIC | Facility: CLINIC | Age: 50
End: 2025-06-19

## 2025-06-19 DIAGNOSIS — L23.7 POISON IVY DERMATITIS: Primary | ICD-10-CM

## 2025-06-19 RX ORDER — BETAMETHASONE DIPROPIONATE 0.5 MG/G
CREAM TOPICAL 2 TIMES DAILY
Qty: 45 G | Refills: 1 | Status: SHIPPED | OUTPATIENT
Start: 2025-06-19

## 2025-06-19 NOTE — TELEPHONE ENCOUNTER
Patient called the RX Refill Line. Message is being forwarded to the office.     Patient is requesting a call back.  He was in yesterday to see the dr and they gave him   triamcinolone (KENALOG) 0.1 % cream.  The pharmacy gave him 3 15g tubes.  He already went through one entire tube yesterday.  He doesn't think he is going to have enough medication.  The tubes are to small.    Please contact patient at [phone]